# Patient Record
Sex: FEMALE | Race: WHITE | NOT HISPANIC OR LATINO | Employment: OTHER | URBAN - METROPOLITAN AREA
[De-identification: names, ages, dates, MRNs, and addresses within clinical notes are randomized per-mention and may not be internally consistent; named-entity substitution may affect disease eponyms.]

---

## 2015-05-04 LAB — HM PAP SMEAR: NEGATIVE

## 2015-05-18 LAB — HM MAMMOGRAM: NEGATIVE

## 2018-04-26 ENCOUNTER — HOSPITAL ENCOUNTER (OUTPATIENT)
Dept: GENERAL RADIOLOGY | Facility: HOSPITAL | Age: 68
Discharge: HOME | End: 2018-04-26

## 2018-04-26 ENCOUNTER — HOSPITAL ENCOUNTER (OUTPATIENT)
Dept: GENERAL RADIOLOGY | Facility: HOSPITAL | Age: 68
Discharge: HOME | End: 2018-04-26
Admitting: ORTHOPAEDIC SURGERY

## 2018-04-26 ENCOUNTER — APPOINTMENT (OUTPATIENT)
Dept: PREADMISSION TESTING | Facility: HOSPITAL | Age: 68
End: 2018-04-26

## 2018-04-26 VITALS
BODY MASS INDEX: 23.49 KG/M2 | OXYGEN SATURATION: 98 % | DIASTOLIC BLOOD PRESSURE: 88 MMHG | HEART RATE: 65 BPM | TEMPERATURE: 97.9 F | RESPIRATION RATE: 20 BRPM | WEIGHT: 141 LBS | HEIGHT: 65 IN | SYSTOLIC BLOOD PRESSURE: 144 MMHG

## 2018-04-26 LAB
ABO GROUP BLD: NORMAL
ALBUMIN SERPL-MCNC: 5 G/DL (ref 3.5–5.2)
ALBUMIN/GLOB SERPL: 1.7 G/DL
ALP SERPL-CCNC: 74 U/L (ref 39–117)
ALT SERPL W P-5'-P-CCNC: 14 U/L (ref 1–33)
ANION GAP SERPL CALCULATED.3IONS-SCNC: 11.5 MMOL/L
AST SERPL-CCNC: 18 U/L (ref 1–32)
BACTERIA UR QL AUTO: ABNORMAL /HPF
BILIRUB SERPL-MCNC: 0.8 MG/DL (ref 0.1–1.2)
BILIRUB UR QL STRIP: NEGATIVE
BLD GP AB SCN SERPL QL: NEGATIVE
BUN BLD-MCNC: 17 MG/DL (ref 8–23)
BUN/CREAT SERPL: 17.7 (ref 7–25)
CALCIUM SPEC-SCNC: 9.9 MG/DL (ref 8.6–10.5)
CHLORIDE SERPL-SCNC: 101 MMOL/L (ref 98–107)
CLARITY UR: CLEAR
CO2 SERPL-SCNC: 27.5 MMOL/L (ref 22–29)
COLOR UR: ABNORMAL
CREAT BLD-MCNC: 0.96 MG/DL (ref 0.57–1)
DEPRECATED RDW RBC AUTO: 49.4 FL (ref 37–54)
ERYTHROCYTE [DISTWIDTH] IN BLOOD BY AUTOMATED COUNT: 13.8 % (ref 11.7–13)
GFR SERPL CREATININE-BSD FRML MDRD: 58 ML/MIN/1.73
GLOBULIN UR ELPH-MCNC: 3 GM/DL
GLUCOSE BLD-MCNC: 115 MG/DL (ref 65–99)
GLUCOSE UR STRIP-MCNC: NEGATIVE MG/DL
HBA1C MFR BLD: 5.7 % (ref 4.8–5.6)
HCT VFR BLD AUTO: 42.9 % (ref 35.6–45.5)
HGB BLD-MCNC: 13.6 G/DL (ref 11.9–15.5)
HGB UR QL STRIP.AUTO: ABNORMAL
HYALINE CASTS UR QL AUTO: ABNORMAL /LPF
INR PPP: 0.97 (ref 0.9–1.1)
KETONES UR QL STRIP: NEGATIVE
LEUKOCYTE ESTERASE UR QL STRIP.AUTO: ABNORMAL
MCH RBC QN AUTO: 31.3 PG (ref 26.9–32)
MCHC RBC AUTO-ENTMCNC: 31.7 G/DL (ref 32.4–36.3)
MCV RBC AUTO: 98.6 FL (ref 80.5–98.2)
NITRITE UR QL STRIP: NEGATIVE
PH UR STRIP.AUTO: <=5 [PH] (ref 5–8)
PLATELET # BLD AUTO: 228 10*3/MM3 (ref 140–500)
PMV BLD AUTO: 11.7 FL (ref 6–12)
POTASSIUM BLD-SCNC: 4.2 MMOL/L (ref 3.5–5.2)
PROT SERPL-MCNC: 8 G/DL (ref 6–8.5)
PROT UR QL STRIP: ABNORMAL
PROTHROMBIN TIME: 12.7 SECONDS (ref 11.7–14.2)
RBC # BLD AUTO: 4.35 10*6/MM3 (ref 3.9–5.2)
RBC # UR: ABNORMAL /HPF
REF LAB TEST METHOD: ABNORMAL
RH BLD: POSITIVE
SODIUM BLD-SCNC: 140 MMOL/L (ref 136–145)
SP GR UR STRIP: 1.02 (ref 1–1.03)
SQUAMOUS #/AREA URNS HPF: ABNORMAL /HPF
T&S EXPIRATION DATE: NORMAL
UROBILINOGEN UR QL STRIP: ABNORMAL
WBC NRBC COR # BLD: 5.9 10*3/MM3 (ref 4.5–10.7)
WBC UR QL AUTO: ABNORMAL /HPF

## 2018-04-26 PROCEDURE — 85027 COMPLETE CBC AUTOMATED: CPT | Performed by: ORTHOPAEDIC SURGERY

## 2018-04-26 PROCEDURE — 93005 ELECTROCARDIOGRAM TRACING: CPT

## 2018-04-26 PROCEDURE — 73502 X-RAY EXAM HIP UNI 2-3 VIEWS: CPT

## 2018-04-26 PROCEDURE — 93010 ELECTROCARDIOGRAM REPORT: CPT | Performed by: INTERNAL MEDICINE

## 2018-04-26 PROCEDURE — 81001 URINALYSIS AUTO W/SCOPE: CPT | Performed by: ORTHOPAEDIC SURGERY

## 2018-04-26 PROCEDURE — 71046 X-RAY EXAM CHEST 2 VIEWS: CPT

## 2018-04-26 PROCEDURE — 86901 BLOOD TYPING SEROLOGIC RH(D): CPT | Performed by: ORTHOPAEDIC SURGERY

## 2018-04-26 PROCEDURE — 36415 COLL VENOUS BLD VENIPUNCTURE: CPT

## 2018-04-26 PROCEDURE — 87086 URINE CULTURE/COLONY COUNT: CPT | Performed by: ORTHOPAEDIC SURGERY

## 2018-04-26 PROCEDURE — 86900 BLOOD TYPING SEROLOGIC ABO: CPT | Performed by: ORTHOPAEDIC SURGERY

## 2018-04-26 PROCEDURE — 83036 HEMOGLOBIN GLYCOSYLATED A1C: CPT | Performed by: ORTHOPAEDIC SURGERY

## 2018-04-26 PROCEDURE — 86850 RBC ANTIBODY SCREEN: CPT | Performed by: ORTHOPAEDIC SURGERY

## 2018-04-26 PROCEDURE — 80053 COMPREHEN METABOLIC PANEL: CPT | Performed by: ORTHOPAEDIC SURGERY

## 2018-04-26 PROCEDURE — 85610 PROTHROMBIN TIME: CPT | Performed by: ORTHOPAEDIC SURGERY

## 2018-04-26 RX ORDER — DICLOFENAC 35 MG/1
1 CAPSULE ORAL EVERY MORNING
COMMUNITY
End: 2018-05-03 | Stop reason: HOSPADM

## 2018-04-26 RX ORDER — TRAMADOL HYDROCHLORIDE 100 MG/1
1 TABLET, FILM COATED, EXTENDED RELEASE ORAL AS NEEDED
COMMUNITY

## 2018-04-26 RX ORDER — HYDROXYCHLOROQUINE SULFATE 200 MG/1
200 TABLET, FILM COATED ORAL EVERY MORNING
COMMUNITY

## 2018-04-26 RX ORDER — ROSUVASTATIN CALCIUM 10 MG/1
10 TABLET, COATED ORAL NIGHTLY
COMMUNITY

## 2018-04-26 ASSESSMENT — HOOS JR
HOOS JR SCORE: 21
HOOS JR SCORE: 20.805

## 2018-04-27 LAB
BACTERIA SPEC AEROBE CULT: NORMAL
BACTERIA SPEC AEROBE CULT: NORMAL

## 2018-05-01 ENCOUNTER — HOSPITAL ENCOUNTER (INPATIENT)
Facility: HOSPITAL | Age: 68
LOS: 2 days | Discharge: HOME HEALTH | End: 2018-05-03
Attending: ORTHOPAEDIC SURGERY | Admitting: ORTHOPAEDIC SURGERY

## 2018-05-01 ENCOUNTER — ANESTHESIA (OUTPATIENT)
Dept: PERIOP | Facility: HOSPITAL | Age: 68
End: 2018-05-01

## 2018-05-01 ENCOUNTER — APPOINTMENT (OUTPATIENT)
Dept: GENERAL RADIOLOGY | Facility: HOSPITAL | Age: 68
End: 2018-05-01

## 2018-05-01 ENCOUNTER — ANESTHESIA EVENT (OUTPATIENT)
Dept: PERIOP | Facility: HOSPITAL | Age: 68
End: 2018-05-01

## 2018-05-01 DIAGNOSIS — M16.11 PRIMARY OSTEOARTHRITIS OF RIGHT HIP: ICD-10-CM

## 2018-05-01 DIAGNOSIS — R26.89 DECREASED MOBILITY: Primary | ICD-10-CM

## 2018-05-01 PROBLEM — M16.9 OA (OSTEOARTHRITIS) OF HIP: Status: ACTIVE | Noted: 2018-05-01

## 2018-05-01 PROCEDURE — 25010000003 CEFAZOLIN IN DEXTROSE 2-4 GM/100ML-% SOLUTION: Performed by: ORTHOPAEDIC SURGERY

## 2018-05-01 PROCEDURE — 25010000002 EPINEPHRINE PER 0.1 MG: Performed by: ORTHOPAEDIC SURGERY

## 2018-05-01 PROCEDURE — 94799 UNLISTED PULMONARY SVC/PX: CPT

## 2018-05-01 PROCEDURE — C1776 JOINT DEVICE (IMPLANTABLE): HCPCS | Performed by: ORTHOPAEDIC SURGERY

## 2018-05-01 PROCEDURE — 25010000002 SUCCINYLCHOLINE PER 20 MG: Performed by: NURSE ANESTHETIST, CERTIFIED REGISTERED

## 2018-05-01 PROCEDURE — 25010000002 HYDROMORPHONE PER 4 MG: Performed by: NURSE ANESTHETIST, CERTIFIED REGISTERED

## 2018-05-01 PROCEDURE — 25010000002 ROPIVACAINE PER 1 MG: Performed by: ORTHOPAEDIC SURGERY

## 2018-05-01 PROCEDURE — 0SR904Z REPLACEMENT OF RIGHT HIP JOINT WITH CERAMIC ON POLYETHYLENE SYNTHETIC SUBSTITUTE, OPEN APPROACH: ICD-10-PCS | Performed by: ORTHOPAEDIC SURGERY

## 2018-05-01 PROCEDURE — 73501 X-RAY EXAM HIP UNI 1 VIEW: CPT

## 2018-05-01 PROCEDURE — 25010000002 ONDANSETRON PER 1 MG: Performed by: NURSE ANESTHETIST, CERTIFIED REGISTERED

## 2018-05-01 PROCEDURE — 97161 PT EVAL LOW COMPLEX 20 MIN: CPT

## 2018-05-01 PROCEDURE — 25010000002 FENTANYL CITRATE (PF) 100 MCG/2ML SOLUTION: Performed by: NURSE ANESTHETIST, CERTIFIED REGISTERED

## 2018-05-01 PROCEDURE — 25010000002 PROPOFOL 10 MG/ML EMULSION: Performed by: NURSE ANESTHETIST, CERTIFIED REGISTERED

## 2018-05-01 PROCEDURE — 25010000002 MIDAZOLAM PER 1 MG: Performed by: ANESTHESIOLOGY

## 2018-05-01 PROCEDURE — 97110 THERAPEUTIC EXERCISES: CPT

## 2018-05-01 PROCEDURE — 25010000002 FENTANYL CITRATE (PF) 100 MCG/2ML SOLUTION: Performed by: ANESTHESIOLOGY

## 2018-05-01 PROCEDURE — 25010000002 MORPHINE (PF) 10 MG/ML SOLUTION 1 ML VIAL: Performed by: ORTHOPAEDIC SURGERY

## 2018-05-01 PROCEDURE — 25010000002 KETOROLAC TROMETHAMINE PER 15 MG: Performed by: ORTHOPAEDIC SURGERY

## 2018-05-01 PROCEDURE — 25010000002 DEXAMETHASONE PER 1 MG: Performed by: NURSE ANESTHETIST, CERTIFIED REGISTERED

## 2018-05-01 DEVICE — CAP HIP VE UPCHRG: Type: IMPLANTABLE DEVICE | Site: ACETABULUM | Status: FUNCTIONAL

## 2018-05-01 DEVICE — CAP CERAM HD HIP UPCHRG: Type: IMPLANTABLE DEVICE | Status: FUNCTIONAL

## 2018-05-01 DEVICE — IMPLANTABLE DEVICE
Type: IMPLANTABLE DEVICE | Site: HIP | Status: FUNCTIONAL
Brand: BIOLOX OPTION HIP SYSTEM

## 2018-05-01 DEVICE — IMPLANTABLE DEVICE
Type: IMPLANTABLE DEVICE | Site: ACETABULUM | Status: FUNCTIONAL
Brand: RINGLOC TRI-SPIKE HIP SYSTEM

## 2018-05-01 DEVICE — IMPLANTABLE DEVICE
Type: IMPLANTABLE DEVICE | Site: HIP | Status: FUNCTIONAL
Brand: BIOLOX DELTA HIP SYSTEM

## 2018-05-01 DEVICE — TOTAL HIP PRIMARY: Type: IMPLANTABLE DEVICE | Site: ACETABULUM | Status: FUNCTIONAL

## 2018-05-01 DEVICE — STEM FEM/HIP TAPERLOC COMPL F/P STD OFFST SZ6: Type: IMPLANTABLE DEVICE | Site: HIP | Status: FUNCTIONAL

## 2018-05-01 DEVICE — IMPLANTABLE DEVICE
Type: IMPLANTABLE DEVICE | Site: ACETABULUM | Status: FUNCTIONAL
Brand: RINGLOC HIP SYSTEM

## 2018-05-01 RX ORDER — FENTANYL CITRATE 50 UG/ML
50 INJECTION, SOLUTION INTRAMUSCULAR; INTRAVENOUS
Status: DISCONTINUED | OUTPATIENT
Start: 2018-05-01 | End: 2018-05-01 | Stop reason: HOSPADM

## 2018-05-01 RX ORDER — DIPHENHYDRAMINE HYDROCHLORIDE 50 MG/ML
12.5 INJECTION INTRAMUSCULAR; INTRAVENOUS
Status: DISCONTINUED | OUTPATIENT
Start: 2018-05-01 | End: 2018-05-01 | Stop reason: HOSPADM

## 2018-05-01 RX ORDER — OXYCODONE HYDROCHLORIDE AND ACETAMINOPHEN 5; 325 MG/1; MG/1
2 TABLET ORAL EVERY 4 HOURS PRN
Status: DISCONTINUED | OUTPATIENT
Start: 2018-05-01 | End: 2018-05-03 | Stop reason: HOSPADM

## 2018-05-01 RX ORDER — MAGNESIUM HYDROXIDE 1200 MG/15ML
LIQUID ORAL AS NEEDED
Status: DISCONTINUED | OUTPATIENT
Start: 2018-05-01 | End: 2018-05-01 | Stop reason: HOSPADM

## 2018-05-01 RX ORDER — LISINOPRIL 10 MG/1
10 TABLET ORAL EVERY MORNING
Status: DISCONTINUED | OUTPATIENT
Start: 2018-05-02 | End: 2018-05-03 | Stop reason: HOSPADM

## 2018-05-01 RX ORDER — ACETAMINOPHEN 325 MG/1
325 TABLET ORAL EVERY 4 HOURS PRN
Status: DISCONTINUED | OUTPATIENT
Start: 2018-05-01 | End: 2018-05-03 | Stop reason: HOSPADM

## 2018-05-01 RX ORDER — PROPOFOL 10 MG/ML
VIAL (ML) INTRAVENOUS AS NEEDED
Status: DISCONTINUED | OUTPATIENT
Start: 2018-05-01 | End: 2018-05-01 | Stop reason: SURG

## 2018-05-01 RX ORDER — OXYCODONE HYDROCHLORIDE AND ACETAMINOPHEN 5; 325 MG/1; MG/1
1 TABLET ORAL EVERY 4 HOURS PRN
Status: DISCONTINUED | OUTPATIENT
Start: 2018-05-01 | End: 2018-05-03 | Stop reason: HOSPADM

## 2018-05-01 RX ORDER — PROMETHAZINE HYDROCHLORIDE 25 MG/ML
12.5 INJECTION, SOLUTION INTRAMUSCULAR; INTRAVENOUS ONCE AS NEEDED
Status: DISCONTINUED | OUTPATIENT
Start: 2018-05-01 | End: 2018-05-01 | Stop reason: HOSPADM

## 2018-05-01 RX ORDER — DIAZEPAM 5 MG/ML
5 INJECTION, SOLUTION INTRAMUSCULAR; INTRAVENOUS 2 TIMES DAILY PRN
Status: ACTIVE | OUTPATIENT
Start: 2018-05-01 | End: 2018-05-02

## 2018-05-01 RX ORDER — SODIUM CHLORIDE 0.9 % (FLUSH) 0.9 %
1-10 SYRINGE (ML) INJECTION AS NEEDED
Status: DISCONTINUED | OUTPATIENT
Start: 2018-05-01 | End: 2018-05-01 | Stop reason: HOSPADM

## 2018-05-01 RX ORDER — HYDROMORPHONE HCL 110MG/55ML
0.5 PATIENT CONTROLLED ANALGESIA SYRINGE INTRAVENOUS
Status: DISCONTINUED | OUTPATIENT
Start: 2018-05-01 | End: 2018-05-01 | Stop reason: HOSPADM

## 2018-05-01 RX ORDER — PROPRANOLOL HCL 60 MG
60 CAPSULE, EXTENDED RELEASE 24HR ORAL EVERY MORNING
Status: DISCONTINUED | OUTPATIENT
Start: 2018-05-02 | End: 2018-05-03 | Stop reason: HOSPADM

## 2018-05-01 RX ORDER — SODIUM CHLORIDE 450 MG/100ML
100 INJECTION, SOLUTION INTRAVENOUS CONTINUOUS
Status: DISCONTINUED | OUTPATIENT
Start: 2018-05-01 | End: 2018-05-03 | Stop reason: HOSPADM

## 2018-05-01 RX ORDER — HYDROCODONE BITARTRATE AND ACETAMINOPHEN 7.5; 325 MG/1; MG/1
1 TABLET ORAL ONCE AS NEEDED
Status: DISCONTINUED | OUTPATIENT
Start: 2018-05-01 | End: 2018-05-01 | Stop reason: HOSPADM

## 2018-05-01 RX ORDER — SODIUM CHLORIDE, SODIUM LACTATE, POTASSIUM CHLORIDE, CALCIUM CHLORIDE 600; 310; 30; 20 MG/100ML; MG/100ML; MG/100ML; MG/100ML
9 INJECTION, SOLUTION INTRAVENOUS CONTINUOUS
Status: DISCONTINUED | OUTPATIENT
Start: 2018-05-01 | End: 2018-05-03 | Stop reason: HOSPADM

## 2018-05-01 RX ORDER — PROMETHAZINE HYDROCHLORIDE 25 MG/1
25 SUPPOSITORY RECTAL ONCE AS NEEDED
Status: DISCONTINUED | OUTPATIENT
Start: 2018-05-01 | End: 2018-05-01 | Stop reason: HOSPADM

## 2018-05-01 RX ORDER — FERROUS SULFATE 325(65) MG
325 TABLET ORAL
Status: DISCONTINUED | OUTPATIENT
Start: 2018-05-02 | End: 2018-05-03 | Stop reason: HOSPADM

## 2018-05-01 RX ORDER — CLINDAMYCIN PHOSPHATE 900 MG/50ML
900 INJECTION INTRAVENOUS ONCE
Status: DISCONTINUED | OUTPATIENT
Start: 2018-05-01 | End: 2018-05-03 | Stop reason: HOSPADM

## 2018-05-01 RX ORDER — DULOXETIN HYDROCHLORIDE 20 MG/1
20 CAPSULE, DELAYED RELEASE ORAL EVERY MORNING
Status: DISCONTINUED | OUTPATIENT
Start: 2018-05-02 | End: 2018-05-03 | Stop reason: HOSPADM

## 2018-05-01 RX ORDER — LIDOCAINE HYDROCHLORIDE 10 MG/ML
0.5 INJECTION, SOLUTION EPIDURAL; INFILTRATION; INTRACAUDAL; PERINEURAL ONCE AS NEEDED
Status: DISCONTINUED | OUTPATIENT
Start: 2018-05-01 | End: 2018-05-01 | Stop reason: HOSPADM

## 2018-05-01 RX ORDER — TRANEXAMIC ACID 100 MG/ML
INJECTION, SOLUTION INTRAVENOUS AS NEEDED
Status: DISCONTINUED | OUTPATIENT
Start: 2018-05-01 | End: 2018-05-01 | Stop reason: SURG

## 2018-05-01 RX ORDER — FENTANYL CITRATE 50 UG/ML
INJECTION, SOLUTION INTRAMUSCULAR; INTRAVENOUS AS NEEDED
Status: DISCONTINUED | OUTPATIENT
Start: 2018-05-01 | End: 2018-05-01 | Stop reason: SURG

## 2018-05-01 RX ORDER — ASPIRIN 325 MG
325 TABLET, DELAYED RELEASE (ENTERIC COATED) ORAL 2 TIMES DAILY WITH MEALS
Status: DISCONTINUED | OUTPATIENT
Start: 2018-05-01 | End: 2018-05-03 | Stop reason: HOSPADM

## 2018-05-01 RX ORDER — DIAZEPAM 5 MG/1
5 TABLET ORAL 2 TIMES DAILY PRN
Status: DISCONTINUED | OUTPATIENT
Start: 2018-05-01 | End: 2018-05-03 | Stop reason: HOSPADM

## 2018-05-01 RX ORDER — CHOLECALCIFEROL (VITAMIN D3) 125 MCG
5 CAPSULE ORAL NIGHTLY PRN
Status: DISCONTINUED | OUTPATIENT
Start: 2018-05-01 | End: 2018-05-03 | Stop reason: HOSPADM

## 2018-05-01 RX ORDER — HYDROMORPHONE HCL 110MG/55ML
PATIENT CONTROLLED ANALGESIA SYRINGE INTRAVENOUS AS NEEDED
Status: DISCONTINUED | OUTPATIENT
Start: 2018-05-01 | End: 2018-05-01 | Stop reason: SURG

## 2018-05-01 RX ORDER — DEXAMETHASONE SODIUM PHOSPHATE 10 MG/ML
INJECTION INTRAMUSCULAR; INTRAVENOUS AS NEEDED
Status: DISCONTINUED | OUTPATIENT
Start: 2018-05-01 | End: 2018-05-01 | Stop reason: SURG

## 2018-05-01 RX ORDER — SUCCINYLCHOLINE CHLORIDE 20 MG/ML
INJECTION INTRAMUSCULAR; INTRAVENOUS AS NEEDED
Status: DISCONTINUED | OUTPATIENT
Start: 2018-05-01 | End: 2018-05-01 | Stop reason: SURG

## 2018-05-01 RX ORDER — ROCURONIUM BROMIDE 10 MG/ML
INJECTION, SOLUTION INTRAVENOUS AS NEEDED
Status: DISCONTINUED | OUTPATIENT
Start: 2018-05-01 | End: 2018-05-01 | Stop reason: SURG

## 2018-05-01 RX ORDER — NALOXONE HCL 0.4 MG/ML
0.4 VIAL (ML) INJECTION
Status: DISCONTINUED | OUTPATIENT
Start: 2018-05-01 | End: 2018-05-03 | Stop reason: HOSPADM

## 2018-05-01 RX ORDER — HYDRALAZINE HYDROCHLORIDE 20 MG/ML
5 INJECTION INTRAMUSCULAR; INTRAVENOUS
Status: DISCONTINUED | OUTPATIENT
Start: 2018-05-01 | End: 2018-05-01 | Stop reason: HOSPADM

## 2018-05-01 RX ORDER — FAMOTIDINE 10 MG/ML
20 INJECTION, SOLUTION INTRAVENOUS ONCE
Status: COMPLETED | OUTPATIENT
Start: 2018-05-01 | End: 2018-05-01

## 2018-05-01 RX ORDER — HYDROXYCHLOROQUINE SULFATE 200 MG/1
200 TABLET, FILM COATED ORAL EVERY MORNING
Status: DISCONTINUED | OUTPATIENT
Start: 2018-05-02 | End: 2018-05-03 | Stop reason: HOSPADM

## 2018-05-01 RX ORDER — BISACODYL 10 MG
10 SUPPOSITORY, RECTAL RECTAL DAILY PRN
Status: DISCONTINUED | OUTPATIENT
Start: 2018-05-01 | End: 2018-05-03 | Stop reason: HOSPADM

## 2018-05-01 RX ORDER — EPHEDRINE SULFATE 50 MG/ML
5 INJECTION, SOLUTION INTRAVENOUS ONCE AS NEEDED
Status: DISCONTINUED | OUTPATIENT
Start: 2018-05-01 | End: 2018-05-01 | Stop reason: HOSPADM

## 2018-05-01 RX ORDER — LABETALOL HYDROCHLORIDE 5 MG/ML
5 INJECTION, SOLUTION INTRAVENOUS
Status: DISCONTINUED | OUTPATIENT
Start: 2018-05-01 | End: 2018-05-01 | Stop reason: HOSPADM

## 2018-05-01 RX ORDER — HYDROCODONE BITARTRATE AND ACETAMINOPHEN 5; 325 MG/1; MG/1
1 TABLET ORAL EVERY 6 HOURS PRN
COMMUNITY
End: 2018-05-03 | Stop reason: HOSPADM

## 2018-05-01 RX ORDER — NALOXONE HCL 0.4 MG/ML
0.2 VIAL (ML) INJECTION AS NEEDED
Status: DISCONTINUED | OUTPATIENT
Start: 2018-05-01 | End: 2018-05-01 | Stop reason: HOSPADM

## 2018-05-01 RX ORDER — OXYCODONE AND ACETAMINOPHEN 7.5; 325 MG/1; MG/1
1 TABLET ORAL ONCE AS NEEDED
Status: DISCONTINUED | OUTPATIENT
Start: 2018-05-01 | End: 2018-05-01 | Stop reason: HOSPADM

## 2018-05-01 RX ORDER — DOCUSATE SODIUM 100 MG/1
100 CAPSULE, LIQUID FILLED ORAL 2 TIMES DAILY PRN
Status: DISCONTINUED | OUTPATIENT
Start: 2018-05-01 | End: 2018-05-03 | Stop reason: HOSPADM

## 2018-05-01 RX ORDER — KETOROLAC TROMETHAMINE 30 MG/ML
15 INJECTION, SOLUTION INTRAMUSCULAR; INTRAVENOUS EVERY 8 HOURS PRN
Status: DISCONTINUED | OUTPATIENT
Start: 2018-05-01 | End: 2018-05-03 | Stop reason: HOSPADM

## 2018-05-01 RX ORDER — PROMETHAZINE HYDROCHLORIDE 25 MG/1
25 TABLET ORAL ONCE AS NEEDED
Status: DISCONTINUED | OUTPATIENT
Start: 2018-05-01 | End: 2018-05-01 | Stop reason: HOSPADM

## 2018-05-01 RX ORDER — ROSUVASTATIN CALCIUM 10 MG/1
10 TABLET, COATED ORAL NIGHTLY
Status: DISCONTINUED | OUTPATIENT
Start: 2018-05-01 | End: 2018-05-03 | Stop reason: HOSPADM

## 2018-05-01 RX ORDER — MIDAZOLAM HYDROCHLORIDE 1 MG/ML
2 INJECTION INTRAMUSCULAR; INTRAVENOUS
Status: DISCONTINUED | OUTPATIENT
Start: 2018-05-01 | End: 2018-05-01 | Stop reason: HOSPADM

## 2018-05-01 RX ORDER — MIDAZOLAM HYDROCHLORIDE 1 MG/ML
1 INJECTION INTRAMUSCULAR; INTRAVENOUS
Status: DISCONTINUED | OUTPATIENT
Start: 2018-05-01 | End: 2018-05-01 | Stop reason: HOSPADM

## 2018-05-01 RX ORDER — MORPHINE SULFATE 2 MG/ML
6 INJECTION, SOLUTION INTRAMUSCULAR; INTRAVENOUS
Status: DISCONTINUED | OUTPATIENT
Start: 2018-05-01 | End: 2018-05-03 | Stop reason: HOSPADM

## 2018-05-01 RX ORDER — ONDANSETRON 4 MG/1
4 TABLET, ORALLY DISINTEGRATING ORAL EVERY 6 HOURS PRN
Status: DISCONTINUED | OUTPATIENT
Start: 2018-05-01 | End: 2018-05-03 | Stop reason: HOSPADM

## 2018-05-01 RX ORDER — ONDANSETRON 2 MG/ML
4 INJECTION INTRAMUSCULAR; INTRAVENOUS ONCE AS NEEDED
Status: DISCONTINUED | OUTPATIENT
Start: 2018-05-01 | End: 2018-05-01 | Stop reason: HOSPADM

## 2018-05-01 RX ORDER — ONDANSETRON 4 MG/1
4 TABLET, FILM COATED ORAL EVERY 6 HOURS PRN
Status: DISCONTINUED | OUTPATIENT
Start: 2018-05-01 | End: 2018-05-03 | Stop reason: HOSPADM

## 2018-05-01 RX ORDER — ONDANSETRON 2 MG/ML
4 INJECTION INTRAMUSCULAR; INTRAVENOUS EVERY 6 HOURS PRN
Status: DISCONTINUED | OUTPATIENT
Start: 2018-05-01 | End: 2018-05-03 | Stop reason: HOSPADM

## 2018-05-01 RX ORDER — EPHEDRINE SULFATE 50 MG/ML
INJECTION, SOLUTION INTRAVENOUS AS NEEDED
Status: DISCONTINUED | OUTPATIENT
Start: 2018-05-01 | End: 2018-05-01 | Stop reason: SURG

## 2018-05-01 RX ORDER — CLINDAMYCIN PHOSPHATE 900 MG/50ML
900 INJECTION INTRAVENOUS EVERY 8 HOURS
Status: COMPLETED | OUTPATIENT
Start: 2018-05-01 | End: 2018-05-02

## 2018-05-01 RX ORDER — LIDOCAINE HYDROCHLORIDE 20 MG/ML
INJECTION, SOLUTION INFILTRATION; PERINEURAL AS NEEDED
Status: DISCONTINUED | OUTPATIENT
Start: 2018-05-01 | End: 2018-05-01 | Stop reason: SURG

## 2018-05-01 RX ORDER — PROMETHAZINE HYDROCHLORIDE 25 MG/1
12.5 TABLET ORAL ONCE AS NEEDED
Status: DISCONTINUED | OUTPATIENT
Start: 2018-05-01 | End: 2018-05-01 | Stop reason: HOSPADM

## 2018-05-01 RX ORDER — ONDANSETRON 2 MG/ML
INJECTION INTRAMUSCULAR; INTRAVENOUS AS NEEDED
Status: DISCONTINUED | OUTPATIENT
Start: 2018-05-01 | End: 2018-05-01 | Stop reason: SURG

## 2018-05-01 RX ORDER — SODIUM CHLORIDE 0.9 % (FLUSH) 0.9 %
1-10 SYRINGE (ML) INJECTION AS NEEDED
Status: DISCONTINUED | OUTPATIENT
Start: 2018-05-01 | End: 2018-05-03 | Stop reason: HOSPADM

## 2018-05-01 RX ORDER — ACETAMINOPHEN 500 MG
1000 TABLET ORAL ONCE
Status: COMPLETED | OUTPATIENT
Start: 2018-05-01 | End: 2018-05-01

## 2018-05-01 RX ORDER — FLUMAZENIL 0.1 MG/ML
0.2 INJECTION INTRAVENOUS AS NEEDED
Status: DISCONTINUED | OUTPATIENT
Start: 2018-05-01 | End: 2018-05-01 | Stop reason: HOSPADM

## 2018-05-01 RX ORDER — SENNA AND DOCUSATE SODIUM 50; 8.6 MG/1; MG/1
2 TABLET, FILM COATED ORAL 2 TIMES DAILY PRN
Status: DISCONTINUED | OUTPATIENT
Start: 2018-05-01 | End: 2018-05-03 | Stop reason: HOSPADM

## 2018-05-01 RX ORDER — CEFAZOLIN SODIUM 2 G/100ML
2 INJECTION, SOLUTION INTRAVENOUS ONCE
Status: COMPLETED | OUTPATIENT
Start: 2018-05-01 | End: 2018-05-01

## 2018-05-01 RX ORDER — CELECOXIB 200 MG/1
200 CAPSULE ORAL ONCE
Status: COMPLETED | OUTPATIENT
Start: 2018-05-01 | End: 2018-05-01

## 2018-05-01 RX ADMIN — EPHEDRINE SULFATE 10 MG: 50 INJECTION INTRAMUSCULAR; INTRAVENOUS; SUBCUTANEOUS at 11:08

## 2018-05-01 RX ADMIN — ROSUVASTATIN CALCIUM 10 MG: 10 TABLET, FILM COATED ORAL at 21:37

## 2018-05-01 RX ADMIN — ONDANSETRON 4 MG: 2 INJECTION INTRAMUSCULAR; INTRAVENOUS at 11:59

## 2018-05-01 RX ADMIN — EPHEDRINE SULFATE 10 MG: 50 INJECTION INTRAMUSCULAR; INTRAVENOUS; SUBCUTANEOUS at 12:12

## 2018-05-01 RX ADMIN — DEXAMETHASONE SODIUM PHOSPHATE 8 MG: 10 INJECTION INTRAMUSCULAR; INTRAVENOUS at 11:10

## 2018-05-01 RX ADMIN — HYDROMORPHONE HYDROCHLORIDE 0.5 MG: 2 INJECTION INTRAMUSCULAR; INTRAVENOUS; SUBCUTANEOUS at 12:20

## 2018-05-01 RX ADMIN — SUGAMMADEX 300 MG: 100 INJECTION, SOLUTION INTRAVENOUS at 12:14

## 2018-05-01 RX ADMIN — OXYCODONE HYDROCHLORIDE AND ACETAMINOPHEN 2 TABLET: 5; 325 TABLET ORAL at 18:30

## 2018-05-01 RX ADMIN — MUPIROCIN: 20 OINTMENT TOPICAL at 21:37

## 2018-05-01 RX ADMIN — CLINDAMYCIN PHOSPHATE 900 MG: 18 INJECTION, SOLUTION INTRAMUSCULAR; INTRAVENOUS at 21:37

## 2018-05-01 RX ADMIN — FENTANYL CITRATE 50 MCG: 50 INJECTION INTRAMUSCULAR; INTRAVENOUS at 09:04

## 2018-05-01 RX ADMIN — CEFAZOLIN SODIUM 2 G: 2 INJECTION, SOLUTION INTRAVENOUS at 11:58

## 2018-05-01 RX ADMIN — MIDAZOLAM HYDROCHLORIDE 1 MG: 2 INJECTION, SOLUTION INTRAMUSCULAR; INTRAVENOUS at 09:35

## 2018-05-01 RX ADMIN — PROPOFOL 130 MG: 10 INJECTION, EMULSION INTRAVENOUS at 10:43

## 2018-05-01 RX ADMIN — MIDAZOLAM HYDROCHLORIDE 1 MG: 2 INJECTION, SOLUTION INTRAMUSCULAR; INTRAVENOUS at 09:38

## 2018-05-01 RX ADMIN — CEFAZOLIN SODIUM 2 G: 2 INJECTION, SOLUTION INTRAVENOUS at 10:32

## 2018-05-01 RX ADMIN — LIDOCAINE HYDROCHLORIDE 60 MG: 20 INJECTION, SOLUTION INFILTRATION; PERINEURAL at 10:43

## 2018-05-01 RX ADMIN — CELECOXIB 200 MG: 200 CAPSULE ORAL at 08:14

## 2018-05-01 RX ADMIN — FENTANYL CITRATE 100 MCG: 50 INJECTION INTRAMUSCULAR; INTRAVENOUS at 10:38

## 2018-05-01 RX ADMIN — ACETAMINOPHEN 1000 MG: 500 TABLET, FILM COATED ORAL at 08:14

## 2018-05-01 RX ADMIN — SODIUM CHLORIDE, POTASSIUM CHLORIDE, SODIUM LACTATE AND CALCIUM CHLORIDE 9 ML/HR: 600; 310; 30; 20 INJECTION, SOLUTION INTRAVENOUS at 09:03

## 2018-05-01 RX ADMIN — HYDROMORPHONE HYDROCHLORIDE 0.5 MG: 2 INJECTION INTRAMUSCULAR; INTRAVENOUS; SUBCUTANEOUS at 11:48

## 2018-05-01 RX ADMIN — FAMOTIDINE 20 MG: 10 INJECTION, SOLUTION INTRAVENOUS at 09:03

## 2018-05-01 RX ADMIN — TRANEXAMIC ACID 1000 MG: 100 INJECTION, SOLUTION INTRAVENOUS at 10:49

## 2018-05-01 RX ADMIN — SUCCINYLCHOLINE CHLORIDE 140 MG: 20 INJECTION, SOLUTION INTRAMUSCULAR; INTRAVENOUS; PARENTERAL at 10:43

## 2018-05-01 RX ADMIN — OXYCODONE HYDROCHLORIDE AND ACETAMINOPHEN 2 TABLET: 5; 325 TABLET ORAL at 22:34

## 2018-05-01 RX ADMIN — ROCURONIUM BROMIDE 50 MG: 10 INJECTION INTRAVENOUS at 10:55

## 2018-05-01 RX ADMIN — MIDAZOLAM HYDROCHLORIDE 2 MG: 2 INJECTION, SOLUTION INTRAMUSCULAR; INTRAVENOUS at 09:03

## 2018-05-01 RX ADMIN — FENTANYL CITRATE 50 MCG: 50 INJECTION INTRAMUSCULAR; INTRAVENOUS at 09:35

## 2018-05-01 RX ADMIN — ASPIRIN 325 MG: 325 TABLET, DELAYED RELEASE ORAL at 18:30

## 2018-05-01 NOTE — PLAN OF CARE
Problem: Fall Risk (Adult)  Goal: Identify Related Risk Factors and Signs and Symptoms  Outcome: Outcome(s) achieved Date Met: 05/01/18 05/01/18 4025   Fall Risk (Adult)   Related Risk Factors (Fall Risk) environment unfamiliar

## 2018-05-01 NOTE — PLAN OF CARE
Problem: Patient Care Overview  Goal: Plan of Care Review  Outcome: Ongoing (interventions implemented as appropriate)    Goal: Individualization and Mutuality  Outcome: Ongoing (interventions implemented as appropriate)    Goal: Discharge Needs Assessment  Outcome: Ongoing (interventions implemented as appropriate)    Goal: Interprofessional Rounds/Family Conf  Outcome: Ongoing (interventions implemented as appropriate)      Problem: Hip Arthroplasty (Total, Partial) (Adult)  Goal: Signs and Symptoms of Listed Potential Problems Will be Absent, Minimized or Managed (Hip Arthroplasty)  Outcome: Ongoing (interventions implemented as appropriate)    Goal: Anesthesia/Sedation Recovery  Outcome: Ongoing (interventions implemented as appropriate)      Problem: Fall Risk (Adult)  Goal: Absence of Fall  Outcome: Ongoing (interventions implemented as appropriate)

## 2018-05-01 NOTE — H&P
"History and Physical    Patient Name:  Jaymie Sosa  YOB: 1950    Medical Records Number:  [id number]    Date of Admission:  5/1/2018  7:22 AM    Chief Complaint:  OA (osteoarthritis) of hip [M16.9]    Jaymie Sosa is a 67 y.o. female who presents c/o severe right hip pain.  The pain has been on and off for many years, worsening recently to the point where the pain is becoming disabling. The pain is a constant dull ache with occasional sharp, stabbing pains.  The patient has failed conservative treatment and would like to proceed with right total hip arthroplasty.    /73 (BP Location: Right arm, Patient Position: Lying)   Pulse 67   Temp 97.9 °F (36.6 °C) (Oral)   Resp 18   Ht 165.1 cm (65\")   Wt 64.8 kg (142 lb 14.4 oz)   SpO2 99%   BMI 23.78 kg/m²     Past Medical History:    Past Medical History:   Diagnosis Date   • Arthritis    • Fibroids    • Headache    • Hypertension        Social History:    Social History     Social History   • Marital status: Single     Spouse name: N/A   • Number of children: N/A   • Years of education: N/A     Occupational History   • Not on file.     Social History Main Topics   • Smoking status: Never Smoker   • Smokeless tobacco: Never Used   • Alcohol use No   • Drug use: No   • Sexual activity: No     Other Topics Concern   • Not on file     Social History Narrative   • No narrative on file       Family History:    Family History   Problem Relation Age of Onset   • Hypertension Mother    • Hypertension Father    • Diabetes Father    • Malig Hyperthermia Neg Hx        Current Medications:    Current Facility-Administered Medications:   •  ceFAZolin in dextrose (ANCEF) IVPB solution 2 g, 2 g, Intravenous, Once, Chico Martin MD  •  famotidine (PEPCID) injection 20 mg, 20 mg, Intravenous, Once, Je Gama MD  •  fentaNYL citrate (PF) (SUBLIMAZE) injection 50 mcg, 50 mcg, Intravenous, Q10 Min PRN, Je Gama MD  •  lactated " ringers infusion, 9 mL/hr, Intravenous, Continuous, Je Gama MD  •  lidocaine PF 1% (XYLOCAINE) injection 0.5 mL, 0.5 mL, Injection, Once PRN, Je Gama MD  •  midazolam (VERSED) injection 1 mg, 1 mg, Intravenous, Q5 Min PRN **OR** midazolam (VERSED) injection 2 mg, 2 mg, Intravenous, Q5 Min PRN, Je Gama MD  •  ropivacaine (NAROPIN) 50 mL, Morphine (PF) 5 mg, ketorolac (TORADOL) 30 mg, EPINEPHrine PF (ADRENALIN) 0.3 mg in sodium chloride 0.9 % 101.8 mL, , Injection, Once, Chico Martin MD  •  sodium chloride 0.9 % flush 1-10 mL, 1-10 mL, Intravenous, PRN, Je Gama MD    Allergies:  No Known Allergies    Review of Systems:   HEENT: Patient denies any headaches, vision changes, change in hearing, or tinnitus, Patient denies any rhinorrhea,epistaxis, sinus pain, mouth or dental problems, sore throat or hoarseness, or dysphagia  Pulmonary: Patient denies any cough, congestion, SOA, or wheezing  Cardiovascular: Patient denies any chest pain, dyspnea, palpitations, weakness, intolerance of exercise, varicosities, swelling of extremities, known murmur  Gastrointestinal:  Patient denies nausea, vomiting, diarrhea, constipation, loss  of appetite, change in appetite, dysphagia, gas, heartburn, melena, change in bowel habits, use of laxatives or other drugs to alter the function of the gastrointestinal tract.  Genital/Urinary: Patient denies dysuria, change in color of urine, change in frequency of urination, pain with urgency, incontinence, retention, or nocturia.  Musculoskeletal: Patient denies increased warmth; redness; or swelling of joints; limitation of function; deformity; crepitation: pain in a joint or an extremity, the neck, or the back, especially with movement.  Neurological: Patient denies dizziness, tremor, ataxia, difficulty in speaking, change in speech, paresthesia, loss of sensation, seizures, syncope, changes in memory.  Endocrine system: Patient denies tremors, palpitations,  intolerance of heat or cold, polyuria, polydipsia, polyphagia, diaphoresis, exophthalmos, or goiter.  Psychological: Patient denies thoughts/plans or harming self or other; depression,  insomnia, night terrors, karlo, memory loss, disorientation.  Skin: Patient denies any bruising, rashes, discoloration, pruritus, wounds, ulcers, decubiti, changes in the hair or nails  Hematopoietic: Patient denies history of spontaneous or excessive bleeding, epistaxis, hematuria, melena, fatigue, enlarged or tender lymph nodes, pallor, history of anemia.        Physical Exam:  Awake, A&O x3, affect normal, no acute distress  Ambulating with a limp due to hip pain  Hip ROM is limited due to pain  No instability  Strength is 4/5 in the quad, hamstring, abduction and adduction  Cap refill is normal, Sensation intact    Card:  RR, HD Stable  Pulm:  Regular breathing, no S.O.A  Abd:  Soft, NT, ND    Lab Results (last 24 hours)     ** No results found for the last 24 hours. **          Xr Chest Pa & Lateral    Result Date: 4/26/2018  Narrative: PA AND LATERAL CHEST X-RAY AND SINGLE VIEW PELVIS 2 VIEWS OF RIGHT HIP 04/26/2018  CLINICAL HISTORY: Patient is preop for right hip surgery, history of right hip pain and hypertension, past smoker.  CHEST: This is correlated to prior chest x-ray 02/27/2014.  FINDINGS: The cardiomediastinal silhouette and pulmonary vasculature are within normal limits. Lungs are clear. Costophrenic angles are sharp.      Impression:  No active disease is seen in the chest with no change when compared to prior chest x-ray 02/27/2014.  RIGHT HIP: Single view of the pelvis and 2 views including AP and frog-leg lateral views of right hip are submitted for interpretation. There is total left hip prosthesis in place with good alignment in the left hip prosthesis. I see no acute fracture or malalignment in bones of the pelvis or right hip. There is severe joint space narrowing in the superior compartment of the right hip  and multiple subchondral cysts and the superior lateral right acetabulum and in the right femoral head and marginal spurring off the right femoral head and acetabulum, compatible with advanced osteoarthritic change in the right hip.  IMPRESSION: There are severe osteoarthritic changes in the right hip as described. There is a left hip prosthesis in place with good alignment of the left hip prosthesis, and no acute fracture is seen in the pelvis or right hip.  This report was finalized on 4/26/2018 11:53 AM by Dr. Chico Tavares MD.      Xr Hip With Or Without Pelvis 2 - 3 View Right    Result Date: 4/26/2018  Narrative: PA AND LATERAL CHEST X-RAY AND SINGLE VIEW PELVIS 2 VIEWS OF RIGHT HIP 04/26/2018  CLINICAL HISTORY: Patient is preop for right hip surgery, history of right hip pain and hypertension, past smoker.  CHEST: This is correlated to prior chest x-ray 02/27/2014.  FINDINGS: The cardiomediastinal silhouette and pulmonary vasculature are within normal limits. Lungs are clear. Costophrenic angles are sharp.      Impression:  No active disease is seen in the chest with no change when compared to prior chest x-ray 02/27/2014.  RIGHT HIP: Single view of the pelvis and 2 views including AP and frog-leg lateral views of right hip are submitted for interpretation. There is total left hip prosthesis in place with good alignment in the left hip prosthesis. I see no acute fracture or malalignment in bones of the pelvis or right hip. There is severe joint space narrowing in the superior compartment of the right hip and multiple subchondral cysts and the superior lateral right acetabulum and in the right femoral head and marginal spurring off the right femoral head and acetabulum, compatible with advanced osteoarthritic change in the right hip.  IMPRESSION: There are severe osteoarthritic changes in the right hip as described. There is a left hip prosthesis in place with good alignment of the left hip prosthesis, and no  acute fracture is seen in the pelvis or right hip.  This report was finalized on 4/26/2018 11:53 AM by Dr. Chico Tavares MD.          Assessment:  End-stage Primary Right Hip Osteoarthritis    Plan:  Patient's pain is becoming disabling, despite extensive conservative treatment.  Radiographs reveal end-stage degenerative changes.  The risks of surgery, including, but not limited to, heart attack, stroke, dying, DVT, leg length inequality, nerve injury, vascular injury, stiffness and infection were discussed.  The alternatives and benefits were also discussed.  All questions answered and the patient wishes to proceed with right total hip arthroplasty.

## 2018-05-01 NOTE — ANESTHESIA PROCEDURE NOTES
Peripheral Block    Start time: 5/1/2018 9:35 AM  Stop time: 5/1/2018 9:46 AM  Performed by  Anesthesiologist: ANUPAMA DIAZ  Preanesthetic Checklist  Completed: patient identified, site marked, surgical consent, pre-op evaluation, timeout performed, IV checked, risks and benefits discussed and monitors and equipment checked  Prep:  Pt Position: supine  Sterile barriers:cap, gloves and sterile barriers  Prep: ChloraPrep  Patient monitoring: blood pressure monitoring, continuous pulse oximetry and EKG  Procedure  Sedation:yes  Performed under: PNB  Guidance:landmark technique  Images:still images not obtained    Laterality:right  Block Type:fascia iliaca compartment  Needle Gauge:20 G    Medications  Local Injected:ropivacaine 0.2% Local Amount Injected:60mL  Post Assessment  Patient Tolerance:comfortable throughout block  Complications:no

## 2018-05-01 NOTE — PLAN OF CARE
Problem: Patient Care Overview  Goal: Plan of Care Review   05/01/18 1548   OTHER   Outcome Summary Pt POD0 R BRIANA. She was prevoiusly independent. Upon eval, pt has minimal contraction of R quad, decreased alertness, decreased balance. Will see pt to improve mobility. Anticipat d/c home with [facility] PT. Will progress towards goals as tolerated

## 2018-05-01 NOTE — PERIOPERATIVE NURSING NOTE
Dr. Martin notified of U/A results from 4/26/18 and pt taking antibiotic, denies s/s of UTI.  Per MD, do not need to repeat urinalysis today in pre-op.

## 2018-05-01 NOTE — ANESTHESIA POSTPROCEDURE EVALUATION
Patient: Jaymie Sosa    Procedure Summary     Date:  05/01/18 Room / Location:  [facility] OR  /  [facility]    Anesthesia Start:  1032 Anesthesia Stop:  1242    Procedure:  RT TOTAL HIP ARTHROPLASTY (Right Hip) Diagnosis:      Surgeon:  Chico Martin MD Provider:  Pasha Jaimes MD    Anesthesia Type:  general, regional ASA Status:  3          Anesthesia Type: general, regional  Last vitals  BP   133/73 (05/01/18 1335)   Temp   36.4 °C (97.6 °F) (05/01/18 1238)   Pulse   51 (05/01/18 1335)   Resp   16 (05/01/18 1335)     SpO2   100 % (05/01/18 1335)     Post Anesthesia Care and Evaluation    Patient location during evaluation: PACU  Anesthetic complications: No anesthetic complications

## 2018-05-01 NOTE — OP NOTE
Operative Note    Patient Name:  Jaymie Sosa  YOB: 1950  Medical Records Number:  [id number]    Date of Procedure:  5/1/2018    Pre-operative Diagnosis:  Primary Osteoarthritis Right Hip    Post-operative Diagnosis:  Primary Osteoarthritis Right Hip    Procedure Performed:  Right Total Hip Arthroplasty                                          Layered Closure    Implants:  Biomet 50 mm Trispike Acetabular Shell, 6 mm Standard Offset Taperloc Complete Stem, 36 mm +3 Lateralized, High-wall Polyethylene Liner, std 36 mm Ceramic Head    Surgeon:  Chico Martin M.D.    Assistant: Tete Matthews (who was present during the critical portions of the case, thereby decreasing operative time and patient morbidity)    Anesthetic Type:  General    Estimated Blood Loss:  250cc's    Specimens: * No orders in the log *    No Complications      Indications for Procedure:  Jaymie Sosa is a 67 y.o. female suffering from end stage degenerative changes in the right hip.  The patients pain is becoming disabling, despite extensive conservative care, including NSAIDS, activity modification and therapy.  The risks, benefits and alternatives were discussed and the patient wishes to proceed with right total hip arthroplasty.      Procedure Performed:    After informed consent was obtained and pre-operative IV antibiotics given, the patient was taken to the operating room and placed supine on the operating table.  After general anesthesia induced and 1 gm of IV Tranxemic Acid given, the patient was placed in the left lateral decubitus position and the right lower extremity was prepped with chloraprep and draped in a sterile fashion.    An incision was made overlying the right hip.  We sharply dissected down to expose the fascia over the gluteus sheela and the Iliotibial band.  We split the fascia in line with the skin incision and placed a Charnley retractor carefully to avoid damage to the Sciatic Nerve.   We then began our posterior approach to the hip by identifying the short external rotators and tagging these with a #5 Tevdek and releasing them from their insertion on the femur.  We then identified the posterior capsule, released the capsule from it's insertion on the femur and tagged the capsule proximally and distally with a #5 Tevdek.  We then dislocated the hip and made our neck cut roughly 2-3 mm proximal to the lesser trochanter. We measured the head to be 47 mm and our neck cut to be 56 mm.      We then gained exposure of the acetabulum and sequentially reamed from a 36 mm reamer up to a 50 mm reamer.  We had excellent interference fit and a nice bleeding, bony bed for our acetabular component.  We then impacted our permanent acetabular component into place, assured we were completely seated by checking through the apical hole, then placed our permanent polyethylene liner.    We then turned our attention to the femur where we cleared the trochanteric fossa of soft tissue.  We entered the canal with a box chisel, hand held reamer and lateralizing reamer.  We then sequentially broached from a 4 mm broach up to a 6 mm broach.  We trialed with a standard neck and  Std 36 mm head and had excellent stability, range of motion and leg length equality.  An intraoperative radiograph revealed excellent implant position and alignment.  We removed our trials and copiously irrigated the hip.  We then placed our permanent 6 std offset stem and trialed again with a -3, std and +3 36 mm heads.  The std. 36 mm head gave us the best range of motion, stability and leg length equality.  We removed the trials, copiously irrigated the hip and placed our permanent head.  We placed our local anesthetic and gave IV antibiotics.  We then reduced the hip and began our layered closure.  We closed the short external rotators and posterior capsule through two drill holes in the greater trochanter and a soft tissue stitch in the Gluteus  Medius.  We closed the deep fascia with a #1 Vicryl, the deep subcutaneous tissue with a #1 Vicryl, the subcutaneous tissue with 2-0 Vicryl and the skin with 3-0 Monocryl and Dermabond.  We placed a sterile dressing of Xeroform and an Island dressing.  The patient was awakened from general anesthesia and taken to the recovery room in stable condition.    The patient will be started on Aspirin 325mg twice daily for DVT prophylaxis.  IV antibiotics will be discontinued within 24 hours of surgery.  Immediately prior to surgery, there were no acute Thromboembolic nor Cardiovascular risk factors.  An updated Medical Reconciliation form is on the chart.

## 2018-05-01 NOTE — ANESTHESIA PREPROCEDURE EVALUATION
Anesthesia Evaluation     Patient summary reviewed and Nursing notes reviewed   NPO Solid Status: > 8 hours  NPO Liquid Status: > 2 hours           Airway   Mallampati: II  no difficulty expected  Dental - normal exam     Pulmonary     breath sounds clear to auscultation  Cardiovascular     ECG reviewed  Patient on routine beta blocker and Beta blocker given within 24 hours of surgery  Rhythm: regular  Rate: normal    (+) hypertension,       Neuro/Psych  GI/Hepatic/Renal/Endo      Musculoskeletal     Abdominal    Substance History      OB/GYN          Other   (+) arthritis                     Anesthesia Plan    ASA 3     general and regional     intravenous induction   Anesthetic plan and risks discussed with patient.

## 2018-05-01 NOTE — THERAPY EVALUATION
Acute Care - Physical Therapy Initial Evaluation   [facility]     Patient Name: Jaymie Sosa  : 1950  MRN: [id number]  Today's Date: 2018         Referring Physician: Veronica      Admit Date: 2018    Visit Dx:     ICD-10-CM ICD-9-CM   1. Decreased mobility R26.89 781.99     Patient Active Problem List   Diagnosis   • OA (osteoarthritis) of hip     Past Medical History:   Diagnosis Date   • Arthritis    • Fibroids    • Headache    • Hypertension      Past Surgical History:   Procedure Laterality Date   • JOINT REPLACEMENT      lt hip   • REPLACEMENT TOTAL KNEE Bilateral    • TONSILLECTOMY          PT ASSESSMENT (last 12 hours)      Physical Therapy Evaluation     Row Name 18 1528          PT Evaluation Time/Intention    Subjective Information complains of;fatigue;pain  -MG     Document Type evaluation;progress note/recertification  -MG     Mode of Treatment physical therapy  -MG     Patient Effort good  -MG     Symptoms Noted During/After Treatment fatigue;increased pain  -MG     Row Name 18 1528          General Information    Patient Profile Reviewed? yes  -MG     Referring Physician Veronica  -     Patient Observations alert;cooperative;agree to therapy   easily arousable   -MG     Patient/Family Observations supine in bed, sleeping upon arrival, daughter present   -MG     Prior Level of Function independent:  -MG     Equipment Currently Used at Home none   has FWW and cane   -MG     Pertinent History of Current Functional Problem POD 0 R BRIANA  -MG     Existing Precautions/Restrictions fall;hip, posterior;right  -MG     Barriers to Rehab none identified  -MG     Row Name 18 1528          Relationship/Environment    Lives With --   to stay with daughter initially   -MG     Row Name 18 1528          Home Main Entrance    Number of Stairs, Main Entrance two  -MG     Stair Railings, Main Entrance --   no railing, 6 stairs w/ rail to next level    -MG     Row Name  05/01/18 1528          Cognitive Assessment/Interventions    Additional Documentation Cognitive Assessment/Intervention (Group)  -MG     Row Name 05/01/18 1528          Cognitive Assessment/Intervention- PT/OT    Affect/Mental Status (Cognitive) --   very drowsy   -MG     Orientation Status (Cognition) oriented x 4  -MG     Follows Commands (Cognition) WFL  -MG     Cognitive Function (Cognitive) WFL  -MG     Safety Deficit (Cognitive) mild deficit;awareness of need for assistance;insight into deficits/self awareness  -MG     Personal Safety Interventions fall prevention program maintained;gait belt;muscle strengthening facilitated;nonskid shoes/slippers when out of bed;supervised activity  -MG     Row Name 05/01/18 1528          Safety Issues, Functional Mobility    Impairments Affecting Function (Mobility) balance;coordination;endurance/activity tolerance;pain;strength;sensation/sensory awareness  -MG     Row Name 05/01/18 1528          Bed Mobility Assessment/Treatment    Bed Mobility Assessment/Treatment supine-sit-supine  -MG     Supine-Sit-Supine Loco (Bed Mobility) moderate assist (50% patient effort)  -MG     Bed Mobility, Safety Issues decreased use of legs for bridging/pushing  -MG     Assistive Device (Bed Mobility) bed rails;head of bed elevated  -MG     Comment (Bed Mobility) cues for hip precautions   -MG     Row Name 05/01/18 1528          Transfer Assessment/Treatment    Transfer Assessment/Treatment sit-stand transfer;stand-sit transfer  -MG     Sit-Stand [place]oco (Transfers) minimum assist (75% patient effort);2 person assist  -MG     Stand-Sit [place] (Transfers) minimum assist (75% patient effort);2 person assist  -MG     Row Name 05/01/18 1528          Sit-Stand Transfer    Assistive Device (Sit-Stand Transfers) walker, front-wheeled  -MG     Row Name 05/01/18 1528          Stand-Sit Transfer    Assistive Device (Stand-Sit Transfers) walker, front-wheeled  -MG     Row Name  05/01/18 1528          Gait/Stairs Assessment/Training    [place] Level (Gait) maximum assist (25% patient effort);2 person assist  -MG     Assistive Device (Gait) walker, front-wheeled  -MG     Distance in Feet (Gait) 2  -MG     Pattern (Gait) step-to  -MG     Comment (Gait/Stairs) R knee buckling, minimal righting reaction, cues for safe use of walker and to bear weight through hands  -MG     Row Name 05/01/18 1528          General ROM    GENERAL ROM COMMENTS R hip limited due to surgery   -MG     Row Name 05/01/18 1528          General Assessment (Manual Muscle Testing)    Comment, General Manual Muscle Testing (MMT) Assessment R hip limited post-op   -MG     Row Name 05/01/18 1528          Motor Assessment/Intervention    Additional Documentation Therapeutic Exercise (Group)  -MG     Row Name 05/01/18 1528          Therapeutic Exercise    Therapeutic Exercise supine, lower extremities  -MG     Additional Documentation Therapeutic Exercise (Row)  -MG     Row Name 05/01/18 1528          Lower Extremity Supine Therapeutic Exercise    Comment, Supine Lower Extremity (Therapeutic Exercise) R LE post op BRIANA exercises 10x. AAROM LAQ and SAQ  -MG     Row Name 05/01/18 1528          Pain Assessment    Additional Documentation Pain Scale: Numbers Pre/Post-Treatment (Group)  -MG     Row Name 05/01/18 1528          Pain Scale: Numbers Pre/Post-Treatment    Pain Scale: Numbers, Pretreatment 1/10  -MG     Pain Scale: Numbers, Post-Treatment 4/10  -MG     Pain Location - Side Right  -MG     Pain Location - Orientation posterior  -MG     Pain Location hip  -MG     Pain Intervention(s) Cold applied;Repositioned;Ambulation/increased activity  -MG     Row Name             Wound 05/01/18 1224 Right other (see notes) incision    Wound - Properties Group Date first assessed: 05/01/18  -GJ Time first assessed: 1224  -GJ Side: Right  -GJ Location: other (see notes)  -GJ Type: incision  -GJ    Row Name 05/01/18 1528           Physical Therapy Clinical Impression    Patient/Family Goals Statement (PT Clinical Impression) return marie e  -MG     Criteria for Skilled Interventions Met (PT Clinical Impression) yes;treatment indicated  -MG     Impairments Found (describe specific impairments) muscle performance;ROM;gait, locomotion, and balance  -MG     Rehab Potential (PT Clinical Summary) good, to achieve stated therapy goals  -MG     Row Name 05/01/18 1528          Physical Therapy Goals    Bed Mobility Goal Selection (PT) bed mobility, PT goal 1  -MG     Transfer Goal Selection (PT) transfer, PT goal 1  -MG     Gait Training Goal Selection (PT) gait training, PT goal 1  -MG     Stairs Goal Selection (PT) stairs, PT goal 1  -MG     Additional Documentation Stairs Goal Selection (PT) (Row)  -MG     Row Name 05/01/18 1528          Bed Mobility Goal 1 (PT)    Activity/Assistive Device (Bed Mobility Goal 1, PT) bed mobility activities, all  -MG     [place] Level/Cues Needed (Bed Mobility Goal 1, PT) conditional independence  -MG     Time Frame (Bed Mobility Goal 1, PT) 3 days  -MG     Row Name 05/01/18 1528          Transfer Goal 1 (PT)    Activity/Assistive Device (Transfer Goal 1, PT) sit-to-stand/stand-to-sit;bed-to-chair/chair-to-bed;walker, rolling  -MG     [place] Level/Cues Needed (Transfer Goal 1, PT) conditional independence  -MG     Time Frame (Transfer Goal 1, PT) 3 days  -MG     Row Name 05/01/18 1528          Gait Training Goal 1 (PT)    Activity/Assistive Device (Gait Training Goal 1, PT) gait (walking locomotion);walker, rolling  -MG     [place] Level (Gait Training Goal 1, PT) conditional independence  -MG     Distance (Gait Goal 1, PT) 100  -MG     Time Frame (Gait Training Goal 1, PT) 3 days  -MG     Row Name 05/01/18 1528          Stairs Goal 1 (PT)    Activity/Assistive Device (Stairs Goal 1, PT) ascending stairs;descending stairs  -MG     [place] Level/Cues Needed (Stairs Goal 1, PT) supervision required   -MG     Number of Stairs (Stairs Goal 1, PT) 6  -MG     Time Frame (Stairs Goal 1, PT) 1 week  -MG     Row Name 05/01/18 1528          Positioning and Restraints    Pre-Treatment Position in bed  -MG     Post Treatment Position bed  -MG     In Bed supine;call light within reach;encouraged to call for assist;exit alarm on;with family/caregiver  -MG     Row Name 05/01/18 1528          Living Environment    Home Accessibility stairs to enter home  -MG       User Key  (r) = Recorded By, (t) = Taken By, (c) = Cosigned By    Initials Name Provider Type    GJ Barbara Moya, RN Registered Nurse    MG Megan Gosselin, PT Physical Therapist          Physical Therapy Education     Title: PT OT SLP Therapies (Done)     Topic: Physical Therapy (Done)     Point: Mobility training (Done)    Learning Progress Summary     Learner Status Readiness Method Response Comment Documented by    Patient Done Acceptance E VU,NR  MG 05/01/18 1548          Point: Home exercise program (Done)    Learning Progress Summary     Learner Status Readiness Method Response Comment Documented by    Patient Done Acceptance E VU,NR  MG 05/01/18 1548          Point: Body mechanics (Done)    Learning Progress Summary     Learner Status Readiness Method Response Comment Documented by    Patient Done Acceptance E VU,NR  MG 05/01/18 1548          Point: Precautions (Done)    Learning Progress Summary     Learner Status Readiness Method Response Comment Documented by    Patient Done Acceptance E VU,NR  MG 05/01/18 1548                      User Key     Initials Effective Dates Name Provider Type [facility]    MG 04/03/18 -  Megan Gosselin, PT Physical Therapist PT                PT Recommendation and Plan  Anticipated Discharge Disposition (PT): home with home health care  Planned Therapy Interventions (PT Eval): balance training, bed mobility training, home exercise program, gait training, patient/family education, stair training, strengthening, vestibular  therapy  Therapy Frequency (PT Clinical Impression): 2 times/day  Outcome Summary/Treatment Plan (PT)  Anticipated Discharge Disposition (PT): home with home health care  Outcome Summary: Pt POD0 R BRIANA. She was prevoiusly independent. Upon eval, pt has minimal contraction of R quad, decreased alertness, decreased balance. Will see pt to improve mobility. Anticipat d/c home with [facility] PT. Will progress towards goals as tolerated           Outcome Measures     Row Name 05/01/18 1500             How much help from another person do you currently need...    Turning from your back to your side while in flat bed without using bedrails? 3  -MG      Moving from lying on back to sitting on the side of a flat bed without bedrails? 3  -MG      Moving to and from a bed to a chair (including a wheelchair)? 3  -MG      Standing up from a chair using your arms (e.g., wheelchair, bedside chair)? 2  -MG      Climbing 3-5 steps with a railing? 1  -MG      To walk in hospital room? 2  -MG      AM-PAC 6 Clicks Score 14  -MG         Functional Assessment    Outcome Measure Options AM-PAC 6 Clicks Basic Mobility (PT)  -MG        User Key  (r) = Recorded By, (t) = Taken By, (c) = Cosigned By    Initials Name Provider Type    MG Megan Gosselin, PT Physical Therapist           Time Calculation:         PT Charges     Row Name 05/01/18 1549             Time Calculation    Start Time 1522  -MG      Stop Time 1533  -MG      Time Calculation (min) 11 min  -MG      PT Received On 05/01/18  -MG      PT - Next Appointment 05/02/18  -MG      PT Goal Re-Cert Due Date 05/04/18  -MG        User Key  (r) = Recorded By, (t) = Taken By, (c) = Cosigned By    Initials Name Provider Type    MG Megan Gosselin, PT Physical Therapist          Therapy Charges for Today     Code Description Service Date Service Provider Modifiers Qty    62330779344 HC PT EVAL LOW COMPLEXITY 2 5/1/2018 Megan Gosselin, PT GP 1    41009823425 HC PT THER PROC EA 15 MIN 5/1/2018 Elida  Gosselin, PT GP 1          PT G-Codes  Outcome Measure Options: AM-PAC 6 Clicks Basic Mobility (PT)      Megan Gosselin, PT  5/1/2018

## 2018-05-02 LAB
ANION GAP SERPL CALCULATED.3IONS-SCNC: 12 MMOL/L
BUN BLD-MCNC: 22 MG/DL (ref 8–23)
BUN/CREAT SERPL: 22.7 (ref 7–25)
CALCIUM SPEC-SCNC: 9.2 MG/DL (ref 8.6–10.5)
CHLORIDE SERPL-SCNC: 102 MMOL/L (ref 98–107)
CO2 SERPL-SCNC: 25 MMOL/L (ref 22–29)
CREAT BLD-MCNC: 0.97 MG/DL (ref 0.57–1)
GFR SERPL CREATININE-BSD FRML MDRD: 57 ML/MIN/1.73
GLUCOSE BLD-MCNC: 138 MG/DL (ref 65–99)
HCT VFR BLD AUTO: 33.7 % (ref 35.6–45.5)
HGB BLD-MCNC: 10.8 G/DL (ref 11.9–15.5)
POTASSIUM BLD-SCNC: 5.1 MMOL/L (ref 3.5–5.2)
SODIUM BLD-SCNC: 139 MMOL/L (ref 136–145)

## 2018-05-02 PROCEDURE — 80048 BASIC METABOLIC PNL TOTAL CA: CPT | Performed by: ORTHOPAEDIC SURGERY

## 2018-05-02 PROCEDURE — 85014 HEMATOCRIT: CPT | Performed by: ORTHOPAEDIC SURGERY

## 2018-05-02 PROCEDURE — 97110 THERAPEUTIC EXERCISES: CPT

## 2018-05-02 PROCEDURE — 85018 HEMOGLOBIN: CPT | Performed by: ORTHOPAEDIC SURGERY

## 2018-05-02 PROCEDURE — 97150 GROUP THERAPEUTIC PROCEDURES: CPT

## 2018-05-02 RX ADMIN — CLINDAMYCIN PHOSPHATE 900 MG: 18 INJECTION, SOLUTION INTRAMUSCULAR; INTRAVENOUS at 05:53

## 2018-05-02 RX ADMIN — MUPIROCIN: 20 OINTMENT TOPICAL at 08:36

## 2018-05-02 RX ADMIN — MUPIROCIN: 20 OINTMENT TOPICAL at 20:39

## 2018-05-02 RX ADMIN — OXYCODONE HYDROCHLORIDE AND ACETAMINOPHEN 2 TABLET: 5; 325 TABLET ORAL at 06:15

## 2018-05-02 RX ADMIN — OXYCODONE HYDROCHLORIDE AND ACETAMINOPHEN 2 TABLET: 5; 325 TABLET ORAL at 14:48

## 2018-05-02 RX ADMIN — OXYCODONE HYDROCHLORIDE AND ACETAMINOPHEN 2 TABLET: 5; 325 TABLET ORAL at 19:27

## 2018-05-02 RX ADMIN — DULOXETINE 20 MG: 20 CAPSULE, DELAYED RELEASE ORAL at 08:36

## 2018-05-02 RX ADMIN — LISINOPRIL 10 MG: 10 TABLET ORAL at 08:37

## 2018-05-02 RX ADMIN — ASPIRIN 325 MG: 325 TABLET, DELAYED RELEASE ORAL at 08:36

## 2018-05-02 RX ADMIN — PROPRANOLOL HYDROCHLORIDE 60 MG: 60 CAPSULE, EXTENDED RELEASE ORAL at 08:36

## 2018-05-02 RX ADMIN — HYDROXYCHLOROQUINE SULFATE 200 MG: 200 TABLET, FILM COATED ORAL at 08:37

## 2018-05-02 RX ADMIN — ASPIRIN 325 MG: 325 TABLET, DELAYED RELEASE ORAL at 17:00

## 2018-05-02 RX ADMIN — OXYCODONE HYDROCHLORIDE AND ACETAMINOPHEN 2 TABLET: 5; 325 TABLET ORAL at 23:46

## 2018-05-02 RX ADMIN — ROSUVASTATIN CALCIUM 10 MG: 10 TABLET, FILM COATED ORAL at 20:40

## 2018-05-02 RX ADMIN — FERROUS SULFATE TAB 325 MG (65 MG ELEMENTAL FE) 325 MG: 325 (65 FE) TAB at 08:37

## 2018-05-02 RX ADMIN — OXYCODONE HYDROCHLORIDE AND ACETAMINOPHEN 1 TABLET: 5; 325 TABLET ORAL at 10:25

## 2018-05-02 NOTE — DISCHARGE PLACEMENT REQUEST
"Faby Barone (67 y.o. Female)     Date of Birth Social Security Number Address Home Phone MRN    1950  [address] [address] [phone] [id number]    Christianity Marital Status          None Single       Admission Date Admission Type Admitting Provider Attending Provider Department, Room/Bed    5/1/18 Elective Chico Martin MD Goodin, Robert A, MD [address], P782/1    Discharge Date Discharge Disposition Discharge Destination                       Attending Provider:  Chico Martin MD    Allergies:  No Known Allergies    Isolation:  None   Infection:  None   Code Status:  FULL    Ht:  165.1 cm (65\")   Wt:  64.8 kg (142 lb 14.4 oz)    Admission Cmt:  None   Principal Problem:  None                Active Insurance as of 5/1/2018     Primary Coverage     Payor Plan Insurance Group Employer/Plan Group    MEDICARE MEDICARE A & B      Payor Plan Address Payor Plan Phone Number Effective From Effective To    [address] [phone] 5/1/2013     [place] [address]       Subscriber Name Subscriber Birth Date Member ID       FABY BARONE 1950 [id number]                 Emergency Contacts      (Rel.) Home Phone Work Phone Mobile Phone    Cele Barone (Daughter) [phone] -- --    Kareen Lambert (Daughter) -- -- [phone]              "

## 2018-05-02 NOTE — THERAPY TREATMENT NOTE
Acute Care - Physical Therapy Treatment Note   [facility]     Patient Name: Jaymie Sosa  : 1950  MRN: [id number]  Today's Date: 2018        Referring Physician: Veronica    Admit Date: 2018    Visit Dx:    ICD-10-CM ICD-9-CM   1. Decreased mobility R26.89 781.99     Patient Active Problem List   Diagnosis   • OA (osteoarthritis) of hip       Therapy Treatment          Rehabilitation Treatment Summary     Row Name 18 1128             Treatment Time/Intention    Discipline physical therapist  -MS      Document Type therapy note (daily note)  -MS      Subjective Information complains of;fatigue;pain  -MS      Patient Effort good  -MS      Comment Pt. reports continued pain/soreness in her Right hip this AM.  -MS      Recorded by [MS] Alonso Eason, PT 18 1130 18 1130      Row Name 18 1128             Cognitive Assessment/Intervention- PT/OT    Orientation Status (Cognition) oriented x 3  -MS      Follows Commands (Cognition) WNL  -MS      Personal Safety Interventions fall prevention program maintained;gait belt;supervised activity;nonskid shoes/slippers when out of bed  -MS      Recorded by [MS] Alonso Eason, PT 18 1130 18 1130      Row Name 18 1128             Mobility Assessment/Intervention    Extremity Weight-bearing Status right lower extremity  -MS      Right Lower Extremity (Weight-bearing Status) weight-bearing as tolerated (WBAT)  -MS      Recorded by [MS] Alonso Eason, PT 18 1130 18 1130      Row Name 18 1128             Bed Mobility Assessment/Treatment    Comment (Bed Mobility) Up in chair this AM for the gym.  -MS      Recorded by [MS] Alonso Eason, PT 18 1130 18 1130      Row Name 18 1128             Transfer Assessment/Treatment    Sit-Stand [place] (Transfers) contact guard  -MS      Stand-Sit [place] (Transfers) contact guard  -MS      Recorded by [MS] Alonso Eason, PT  05/02/18 1130 05/02/18 1130      Row Name 05/02/18 1128             Sit-Stand Transfer    Assistive Device (Sit-Stand Transfers) walker, front-wheeled  -MS      Recorded by [MS] Alonso Eason, PT 05/02/18 1130 05/02/18 1130      Row Name 05/02/18 1128             Stand-Sit Transfer    Assistive Device (Stand-Sit Transfers) walker, front-wheeled  -MS      Recorded by [MS] Alonso Eason, PT 05/02/18 1130 05/02/18 1130      Row Name 05/02/18 1128             Gait/Stairs Assessment/Training    [place] Level (Gait) contact guard  -MS      Assistive Device (Gait) walker, front-wheeled  -MS      Distance in Feet (Gait) 100 feet  -MS      Pattern (Gait) step-through  -MS      Deviations/Abnormal Patterns (Gait) antalgic;ela decreased  -MS      Recorded by [MS] Alonso Eason, PT 05/02/18 1130 05/02/18 1130      Row Name 05/02/18 1128             Therapeutic Exercise    Comment (Therapeutic Exercise) Right THR Protocol x 15 reps completed as well as standing Hip abd, mini-squats, heel raises x 10 reps  -MS      Recorded by [MS] Alonso Eason, PT 05/02/18 1130 05/02/18 1130      Row Name 05/02/18 1128             Positioning and Restraints    Pre-Treatment Position sitting in chair/recliner  -MS      Post Treatment Position chair  -MS      In Chair notified nsg;reclined;sitting;call light within reach;encouraged to call for assist  -MS      Recorded by [MS] Alonso Eason, PT 05/02/18 1130 05/02/18 1130      Row Name 05/02/18 1128             Pain Scale: Numbers Pre/Post-Treatment    Pain Scale: Numbers, Pretreatment 4/10  -MS      Pain Scale: Numbers, Post-Treatment 4/10  -MS      Pain Location - Side Right  -MS      Pain Location hip  -MS      Pain Intervention(s) Medication (See MAR);Cold applied;Repositioned;Elevated  -MS      Recorded by [MS] Alonso Eason, PT 05/02/18 1130 05/02/18 1130      Row Name                Wound 05/01/18 1224 Right other (see notes) incision    Wound - Properties Group  Date first assessed: 05/01/18 [GJ] Time first assessed: 1224 [GJ] Side: Right [GJ] Location: other (see notes) [GJ] Type: incision [GJ] Recorded by:  [GJ] Barbara Moya RN 05/01/18 1224 05/01/18 1224      User Key  (r) = Recorded By, (t) = Taken By, (c) = Cosigned By    Initials Name Effective Dates Discipline    NANNETTE Moya RN 06/16/16 -  Nurse    MS Alonso Eason, PT 04/03/18 -  PT          Wound 05/01/18 1224 Right other (see notes) incision (Active)   Dressing Appearance dry;intact;no drainage 5/2/2018  3:22 AM   Drainage Amount none 5/2/2018  3:22 AM   Dressing Care, Wound low-adherent 5/1/2018 12:38 PM             Physical Therapy Education     Title: PT OT SLP Therapies (Done)     Topic: Physical Therapy (Done)     Point: Mobility training (Done)    Learning Progress Summary     Learner Status Readiness Method Response Comment Documented by    Patient Done Acceptance DARCY WALLACENR  MS 05/02/18 1130     Done Acceptance E VU,NR  MG 05/01/18 1548          Point: Home exercise program (Done)    Learning Progress Summary     Learner Status Readiness Method Response Comment Documented by    Patient Done Acceptance DARCY WALLACENR  MS 05/02/18 1130     Done Acceptance E VU,NR  MG 05/01/18 1548          Point: Body mechanics (Done)    Learning Progress Summary     Learner Status Readiness Method Response Comment Documented by    Patient Done Acceptance DARCY WALLACENR  MS 05/02/18 1130     Done Acceptance E VU,NR  MG 05/01/18 1548          Point: Precautions (Done)    Learning Progress Summary     Learner Status Readiness Method Response Comment Documented by    Patient Done Acceptance DARCY WALLACENR  MS 05/02/18 1130     Done Acceptance E VU,NR  MG 05/01/18 1548                      User Key     Initials Effective Dates Name Provider Type Discipline    MS 04/03/18 -  Alonso Eason, PT Physical Therapist PT    MG 04/03/18 -  Megan Gosselin, PT Physical Therapist PT                    PT Recommendation and Plan      Plan of Care Reviewed With: patient  Progress: improving  Outcome Summary: Improved tolerance to functional activity this day with an increase in gait distance and progression of ther. ex. protocol.          Outcome Measures     Row Name 05/02/18 1100 05/01/18 1500          How much help from another person do you currently need...    Turning from your back to your side while in flat bed without using bedrails? 4  -MS 3  -MG     Moving from lying on back to sitting on the side of a flat bed without bedrails? 3  -MS 3  -MG     Moving to and from a bed to a chair (including a wheelchair)? 3  -MS 3  -MG     Standing up from a chair using your arms (e.g., wheelchair, bedside chair)? 3  -MS 2  -MG     Climbing 3-5 steps with a railing? 3  -MS 1  -MG     To walk in hospital room? 3  -MS 2  -MG     AM-PAC 6 Clicks Score 19  -MS 14  -MG        Functional Assessment    Outcome Measure Options AM-PAC 6 Clicks Basic Mobility (PT)  -MS AM-PAC 6 Clicks Basic Mobility (PT)  -MG       User Key  (r) = Recorded By, (t) = Taken By, (c) = Cosigned By    Initials Name Provider Type    MS Alonso RAJANI Eason, PT Physical Therapist    MG Megan Gosselin, PT Physical Therapist           Time Calculation:         PT Charges     Row Name 05/02/18 1131             Time Calculation    Start Time 1040  -MS      Stop Time 1117  -MS      Time Calculation (min) 37 min  -MS      PT Received On 05/02/18  -MS      PT - Next Appointment 05/02/18  -MS        User Key  (r) = Recorded By, (t) = Taken By, (c) = Cosigned By    Initials Name Provider Type    MS Alonso RAJANI Eason, PT Physical Therapist          Therapy Charges for Today     Code Description Service Date Service Provider Modifiers Qty    99628897218 HC PT THER PROC EA 15 MIN 5/2/2018 Alonso Eason, PT GP 1    01525825945 HC PT THER PROC GROUP 5/2/2018 Alonso Eason, PT GP 1          PT G-Codes  Outcome Measure Options: AM-PAC 6 Clicks Basic Mobility (PT)    Alonso Eason,  PT  5/2/2018

## 2018-05-02 NOTE — PLAN OF CARE
Problem: Patient Care Overview  Goal: Plan of Care Review  Outcome: Ongoing (interventions implemented as appropriate)   05/02/18 3215   OTHER   Outcome Summary VSS. Pain controlled with PO pain med. Dressing changed, c/d/i. Ambulating with assist and walker. Voiding without difficulty. Discussed BP med and monitoring r/t HTN. Plans to d/c home tomorrow with HH.    Coping/Psychosocial   Plan of Care Reviewed With patient   Plan of Care Review   Progress improving       Problem: Hip Arthroplasty (Total, Partial) (Adult)  Goal: Signs and Symptoms of Listed Potential Problems Will be Absent, Minimized or Managed (Hip Arthroplasty)  Outcome: Ongoing (interventions implemented as appropriate)    Goal: Anesthesia/Sedation Recovery  Outcome: Ongoing (interventions implemented as appropriate)      Problem: Fall Risk (Adult)  Goal: Absence of Fall  Outcome: Ongoing (interventions implemented as appropriate)

## 2018-05-02 NOTE — PLAN OF CARE
Problem: Patient Care Overview  Goal: Plan of Care Review   05/02/18 1639   OTHER   Outcome Summary Improved tolerance to functional activity this PM with an increase in gait distance and progression of ther. ex. protocol.    Coping/Psychosocial   Plan of Care Reviewed With patient   Plan of Care Review   Progress improving

## 2018-05-02 NOTE — PROGRESS NOTES
"Ortho POD 1    Patients Name:  Jaymie Sosa  YOB: 1950  Medical Records Number:  [id number]    No complaints except pain    BP 97/65 (BP Location: Right arm, Patient Position: Lying)   Pulse 67   Temp 97.1 °F (36.2 °C) (Oral)   Resp 16   Ht 165.1 cm (65\")   Wt 64.8 kg (142 lb 14.4 oz)   SpO2 98%   BMI 23.78 kg/m²     RLE:  NVI, calf nontender, Sensation intact  No signs of DVT    Incision: clean, no infection    Lab Results (last 24 hours)     Procedure Component Value Units Date/Time    Basic Metabolic Panel [[id number]]  (Abnormal) Collected:  05/02/18 0340    Specimen:  Blood Updated:  05/02/18 0504     Glucose 138 (H) mg/dL      BUN 22 mg/dL      Creatinine 0.97 mg/dL      Sodium 139 mmol/L      Potassium 5.1 mmol/L      Chloride 102 mmol/L      CO2 25.0 mmol/L      Calcium 9.2 mg/dL      eGFR Non African Amer 57 (L) mL/min/1.73      BUN/Creatinine Ratio 22.7     Anion Gap 12.0 mmol/L     Narrative:       GFR Normal >60  Chronic Kidney Disease <60  Kidney Failure <15    Hemoglobin & Hematocrit, Blood [[id number]]  (Abnormal) Collected:  05/02/18 0340    Specimen:  Blood Updated:  05/02/18 0500     Hemoglobin 10.8 (L) g/dL      Hematocrit 33.7 (L) %           Xr Hip 1 View Without Pelvis Right (surgery Only)    Result Date: 5/1/2018  Narrative: ONE-VIEW PORTABLE RIGHT HIP  HISTORY: Hip replacement for osteoarthritis.  FINDINGS:  The patient has had recent total hip replacement and the alignment appears satisfactory.  This report was finalized on 5/1/2018 2:29 PM by Dr. Kyle Pitt M.D..      Xr Chest Pa & Lateral    Result Date: 4/26/2018  Narrative: PA AND LATERAL CHEST X-RAY AND SINGLE VIEW PELVIS 2 VIEWS OF RIGHT HIP 04/26/2018  CLINICAL HISTORY: Patient is preop for right hip surgery, history of right hip pain and hypertension, past smoker.  CHEST: This is correlated to prior chest x-ray 02/27/2014.  FINDINGS: The cardiomediastinal silhouette and pulmonary vasculature are " within normal limits. Lungs are clear. Costophrenic angles are sharp.      Impression:  No active disease is seen in the chest with no change when compared to prior chest x-ray 02/27/2014.  RIGHT HIP: Single view of the pelvis and 2 views including AP and frog-leg lateral views of right hip are submitted for interpretation. There is total left hip prosthesis in place with good alignment in the left hip prosthesis. I see no acute fracture or malalignment in bones of the pelvis or right hip. There is severe joint space narrowing in the superior compartment of the right hip and multiple subchondral cysts and the superior lateral right acetabulum and in the right femoral head and marginal spurring off the right femoral head and acetabulum, compatible with advanced osteoarthritic change in the right hip.  IMPRESSION: There are severe osteoarthritic changes in the right hip as described. There is a left hip prosthesis in place with good alignment of the left hip prosthesis, and no acute fracture is seen in the pelvis or right hip.  This report was finalized on 4/26/2018 11:53 AM by Dr. Chico Tavares MD.      Xr Hip With Or Without Pelvis 2 - 3 View Right    Result Date: 4/26/2018  Narrative: PA AND LATERAL CHEST X-RAY AND SINGLE VIEW PELVIS 2 VIEWS OF RIGHT HIP 04/26/2018  CLINICAL HISTORY: Patient is preop for right hip surgery, history of right hip pain and hypertension, past smoker.  CHEST: This is correlated to prior chest x-ray 02/27/2014.  FINDINGS: The cardiomediastinal silhouette and pulmonary vasculature are within normal limits. Lungs are clear. Costophrenic angles are sharp.      Impression:  No active disease is seen in the chest with no change when compared to prior chest x-ray 02/27/2014.  RIGHT HIP: Single view of the pelvis and 2 views including AP and frog-leg lateral views of right hip are submitted for interpretation. There is total left hip prosthesis in place with good alignment in the left hip  prosthesis. I see no acute fracture or malalignment in bones of the pelvis or right hip. There is severe joint space narrowing in the superior compartment of the right hip and multiple subchondral cysts and the superior lateral right acetabulum and in the right femoral head and marginal spurring off the right femoral head and acetabulum, compatible with advanced osteoarthritic change in the right hip.  IMPRESSION: There are severe osteoarthritic changes in the right hip as described. There is a left hip prosthesis in place with good alignment of the left hip prosthesis, and no acute fracture is seen in the pelvis or right hip.  This report was finalized on 4/26/2018 11:53 AM by Dr. Chico Tavares MD.          S/p Right BRIANA  WBAT with walker  ASA for DVT prophylaxis

## 2018-05-02 NOTE — THERAPY TREATMENT NOTE
Acute Care - Physical Therapy Treatment Note   [facility]     Patient Name: Jaymie Sosa  : 1950  MRN: [id number]  Today's Date: 2018        Referring Physician: Veronica    Admit Date: 2018    Visit Dx:    ICD-10-CM ICD-9-CM   1. Decreased mobility R26.89 781.99     Patient Active Problem List   Diagnosis   • OA (osteoarthritis) of hip       Therapy Treatment          Rehabilitation Treatment Summary     Row Name 18 1637 18 1128          Treatment Time/Intention    Discipline physical therapist  -MS physical therapist  -MS     Document Type therapy note (daily note)  -MS therapy note (daily note)  -MS     Subjective Information complains of;fatigue;pain  -MS complains of;fatigue;pain  -MS     Patient Effort good  -MS good  -MS     Comment Pt. reports increased pain/soreness in her Right hip this PM.  -MS Pt. reports continued pain/soreness in her Right hip this AM.  -MS     Recorded by [MS] Alonso Eason, PT 18 1639 18 1639 [MS] Alonso Eason, PT 18 1130 18 1130     Row Name 18 1637 18 1128          Cognitive Assessment/Intervention- PT/OT    Orientation Status (Cognition) oriented x 3  -MS oriented x 3  -MS     Follows Commands (Cognition) WNL  -MS WNL  -MS     Personal Safety Interventions fall prevention program maintained;gait belt;nonskid shoes/slippers when out of bed;supervised activity  -MS fall prevention program maintained;gait belt;supervised activity;nonskid shoes/slippers when out of bed  -MS     Recorded by [MS] Alonso Eason, PT 18 1639 18 1639 [MS] Alonso Eason, PT 18 1130 18 1130     Row Name 18 1637 18 1128          Mobility Assessment/Intervention    Extremity Weight-bearing Status  -- right lower extremity  -MS     Right Lower Extremity (Weight-bearing Status) weight-bearing as tolerated (WBAT)  -MS weight-bearing as tolerated (WBAT)  -MS     Recorded by [MS] Alonso GERARD  Yumi, PT 05/02/18 1639 05/02/18 1639 [MS] Alonso Eason, PT 05/02/18 1130 05/02/18 1130     Row Name 05/02/18 1637 05/02/18 1128          Bed Mobility Assessment/Treatment    Comment (Bed Mobility) Pt. up in chair this PM for the gym.  -MS Up in chair this AM for the gym.  -MS     Recorded by [MS] Alonso Eason, PT 05/02/18 1639 05/02/18 1639 [MS] Alonso Eason, PT 05/02/18 1130 05/02/18 1130     Row Name 05/02/18 1637 05/02/18 1128          Transfer Assessment/Treatment    Sit-Stand [place] (Transfers) contact guard  -MS contact guard  -MS     Stand-Sit [place] (Transfers) contact guard  -MS contact guard  -MS     Recorded by [MS] Alonso Eason, PT 05/02/18 1639 05/02/18 1639 [MS] Alonso Eason, PT 05/02/18 1130 05/02/18 1130     Row Name 05/02/18 1637 05/02/18 1128          Sit-Stand Transfer    Assistive Device (Sit-Stand Transfers) walker, front-wheeled  -MS walker, front-wheeled  -MS     Recorded by [MS] Alonso Eason, PT 05/02/18 1639 05/02/18 1639 [MS] Alonso Eason, PT 05/02/18 1130 05/02/18 1130     Row Name 05/02/18 1637 05/02/18 1128          Stand-Sit Transfer    Assistive Device (Stand-Sit Transfers) walker, front-wheeled  -MS walker, front-wheeled  -MS     Recorded by [MS] Alonso Eason, PT 05/02/18 1639 05/02/18 1639 [MS] Alonso Eason, PT 05/02/18 1130 05/02/18 1130     Row Name 05/02/18 1637 05/02/18 1128          Gait/Stairs Assessment/Training    [place] Level (Gait) contact guard  -MS contact guard  -MS     Assistive Device (Gait) walker, front-wheeled  -MS walker, front-wheeled  -MS     Distance in Feet (Gait) 120 feet  - feet  -MS     Pattern (Gait) step-through  -MS step-through  -MS     Deviations/Abnormal Patterns (Gait) antalgic;ela decreased  -MS antalgic;lea decreased  -MS     Recorded by [MS] Alonso Eason, PT 05/02/18 1639 05/02/18 1639 [MS] Alonso Eason, PT 05/02/18 1130 05/02/18 1130     Row Name 05/02/18 1637 05/02/18  1128          Therapeutic Exercise    Comment (Therapeutic Exercise) Right THR Protocol x 20 reps completed as well as standing hip abd, mini-squats, heel raises x 10 reps  -MS Right THR Protocol x 15 reps completed as well as standing Hip abd, mini-squats, heel raises x 10 reps  -MS     Recorded by [MS] Alonso Eason, PT 05/02/18 1639 05/02/18 1639 [MS] Alonso Eason, PT 05/02/18 1130 05/02/18 1130     Row Name 05/02/18 1637 05/02/18 1128          Positioning and Restraints    Pre-Treatment Position sitting in chair/recliner  -MS sitting in chair/recliner  -MS     Post Treatment Position chair  -MS chair  -MS     In Chair notified nsg;reclined;sitting;call light within reach;encouraged to call for assist  -MS notified nsg;reclined;sitting;call light within reach;encouraged to call for assist  -MS     Recorded by [MS] Alonso Eason, PT 05/02/18 1639 05/02/18 1639 [MS] Alonso Eason, PT 05/02/18 1130 05/02/18 1130     Row Name 05/02/18 1637 05/02/18 1128          Pain Scale: Numbers Pre/Post-Treatment    Pain Scale: Numbers, Pretreatment 6/10  -MS 4/10  -MS     Pain Scale: Numbers, Post-Treatment 6/10  -MS 4/10  -MS     Pain Location - Side Right  -MS Right  -MS     Pain Location hip  -MS hip  -MS     Pain Intervention(s) Medication (See MAR);Cold applied;Repositioned;Elevated;Rest  -MS Medication (See MAR);Cold applied;Repositioned;Elevated  -MS     Recorded by [MS] Alonso Eason, PT 05/02/18 1639 05/02/18 1639 [MS] Alonso Eason, PT 05/02/18 1130 05/02/18 1130     Row Name                Wound 05/01/18 1224 Right other (see notes) incision    Wound - Properties Group Date first assessed: 05/01/18 [GJ] Time first assessed: 1224 [GJ] Side: Right [GJ] Location: other (see notes) [GJ] Type: incision [GJ] Recorded by:  [GJ] Barbara Moya RN 05/01/18 1224 05/01/18 1224      User Key  (r) = Recorded By, (t) = Taken By, (c) = Cosigned By    Initials Name Effective Dates Discipline    NANNETTE Jimenez  Griffin RN 06/16/16 -  Nurse    MS Alonso Eason, PT 04/03/18 -  PT          Wound 05/01/18 1224 Right other (see notes) incision (Active)   Dressing Appearance dry;intact;no drainage 5/2/2018 12:30 PM   Closure Liquid skin adhesive 5/2/2018 12:30 PM   Base dry 5/2/2018 12:30 PM   Drainage Amount none 5/2/2018  3:22 AM   Dressing Care, Wound dressing changed;dressing applied 5/2/2018  8:32 AM             Physical Therapy Education     Title: PT OT SLP Therapies (Done)     Topic: Physical Therapy (Done)     Point: Mobility training (Done)    Learning Progress Summary     Learner Status Readiness Method Response Comment Documented by    Patient Done Acceptance E,D VU,NR  MS 05/02/18 1639     Done Acceptance E,D VU,NR  MS 05/02/18 1130     Done Acceptance E VU,NR  MG 05/01/18 1548          Point: Home exercise program (Done)    Learning Progress Summary     Learner Status Readiness Method Response Comment Documented by    Patient Done Acceptance E,D VU,NR  MS 05/02/18 1639     Done Acceptance E,D VU,NR  MS 05/02/18 1130     Done Acceptance E VU,NR  MG 05/01/18 1548          Point: Body mechanics (Done)    Learning Progress Summary     Learner Status Readiness Method Response Comment Documented by    Patient Done Acceptance E,D VU,NR  MS 05/02/18 1639     Done Acceptance E,D VU,NR  MS 05/02/18 1130     Done Acceptance E VU,NR  MG 05/01/18 1548          Point: Precautions (Done)    Learning Progress Summary     Learner Status Readiness Method Response Comment Documented by    Patient Done Acceptance E,D VU,NR  MS 05/02/18 1639     Done Acceptance E,D VU,NR  MS 05/02/18 1130     Done Acceptance E VU,NR  MG 05/01/18 1548                      User Key     Initials Effective Dates Name Provider Type Discipline    MS 04/03/18 -  Alonso Eason, PT Physical Therapist PT    MG 04/03/18 -  Megan Gosselin, PT Physical Therapist PT                    PT Recommendation and Plan     Plan of Care Reviewed With:  patient  Progress: improving  Outcome Summary: Improved tolerance to functional activity this PM with an increase in gait distance and progression of ther. ex. protocol.           Outcome Measures     Row Name 05/02/18 1600 05/02/18 1100 05/01/18 1500       How much help from another person do you currently need...    Turning from your back to your side while in flat bed without using bedrails? 4  -MS 4  -MS 3  -MG    Moving from lying on back to sitting on the side of a flat bed without bedrails? 3  -MS 3  -MS 3  -MG    Moving to and from a bed to a chair (including a wheelchair)? 3  -MS 3  -MS 3  -MG    Standing up from a chair using your arms (e.g., wheelchair, bedside chair)? 3  -MS 3  -MS 2  -MG    Climbing 3-5 steps with a railing? 3  -MS 3  -MS 1  -MG    To walk in hospital room? 3  -MS 3  -MS 2  -MG    AM-PAC 6 Clicks Score 19  -MS 19  -MS 14  -MG       Functional Assessment    Outcome Measure Options AM-PAC 6 Clicks Basic Mobility (PT)  -MS AM-PAC 6 Clicks Basic Mobility (PT)  -MS AM-PAC 6 Clicks Basic Mobility (PT)  -MG      User Key  (r) = Recorded By, (t) = Taken By, (c) = Cosigned By    Initials Name Provider Type    MS Alonso Eason, PT Physical Therapist    MG Megan Gosselin, PT Physical Therapist           Time Calculation:         PT Charges     Row Name 05/02/18 1639 05/02/18 1131          Time Calculation    Start Time 1500  -MS 1040  -MS     Stop Time 1525  -MS 1117  -MS     Time Calculation (min) 25 min  -MS 37 min  -MS     PT Received On 05/02/18  -MS 05/02/18  -MS     PT - Next Appointment 05/03/18  -MS 05/02/18  -MS       User Key  (r) = Recorded By, (t) = Taken By, (c) = Cosigned By    Initials Name Provider Type    MS Alonso Eason, PT Physical Therapist          Therapy Charges for Today     Code Description Service Date Service Provider Modifiers Qty    10550279918  PT THER PROC EA 15 MIN 5/2/2018 Alonso Eason, PT GP 1    [id number]  PT THER PROC GROUP 5/2/2018 Alonso GERARD  Yumi, PT GP 1    71075478631 HC PT THER PROC EA 15 MIN 5/2/2018 Alonso Eason, PT GP 1    58926820711 HC PT THER PROC GROUP 5/2/2018 Alonso Eason, PT GP 1          PT G-Codes  Outcome Measure Options: AM-PAC 6 Clicks Basic Mobility (PT)    Alonso Eason, PT  5/2/2018

## 2018-05-02 NOTE — PLAN OF CARE
Problem: Patient Care Overview  Goal: Plan of Care Review  Outcome: Ongoing (interventions implemented as appropriate)   05/02/18 7834   OTHER   Outcome Summary pain under control with pain meds. vitals stable. ambulates with assist x1. dressing dry and intact. voiding without difficulty. vascular checks wnl. NWB maintained to RLE. educated patient on importance of monitoring blood pressure given h/o hypertension, verbalises understanding.   Coping/Psychosocial   Plan of Care Reviewed With patient   Plan of Care Review   Progress improving     Goal: Individualization and Mutuality  Outcome: Ongoing (interventions implemented as appropriate)    Goal: Discharge Needs Assessment  Outcome: Ongoing (interventions implemented as appropriate)    Goal: Interprofessional Rounds/Family Conf  Outcome: Ongoing (interventions implemented as appropriate)      Problem: Hip Arthroplasty (Total, Partial) (Adult)  Goal: Signs and Symptoms of Listed Potential Problems Will be Absent, Minimized or Managed (Hip Arthroplasty)  Outcome: Ongoing (interventions implemented as appropriate)      Problem: Fall Risk (Adult)  Goal: Absence of Fall  Outcome: Ongoing (interventions implemented as appropriate)

## 2018-05-02 NOTE — PROGRESS NOTES
Discharge Planning Assessment   [facility]     Patient Name: Jaymie Sosa  MRN: [id number]  Today's Date: 5/2/2018    Admit Date: 5/1/2018          Discharge Needs Assessment     Row Name 05/02/18 1027       Living Environment    Current Living Arrangements home/apartment/condo    Family Caregiver if Needed sibling(s)    Quality of Family Relationships involved;helpful;supportive    Able to Return to Prior Arrangements yes       Resource/Environmental Concerns    Resource/Environmental Concerns none       Transition Planning    Patient/Family Anticipates Transition to home with family    Transportation Anticipated family or friend will provide       Discharge Needs Assessment    Readmission Within the Last 30 Days no previous admission in last 30 days    Concerns to be Addressed no discharge needs identified;denies needs/concerns at this time    Equipment Currently Used at Home none    Discharge Facility/Level of Care Needs home with home health    Offered/Gave Vendor List yes            Discharge Plan     Row Name 05/02/18 1037       Plan    Plan Home w/ sister and [facility].     Patient/Family in Agreement with Plan yes    Plan Comments S/w pt, verified facesheet and d/c plan. Pt plans to d/c to her sister's home ([address] , [address]). She would like [facility] notified and will accept.         Destination     No service coordination in this encounter.      Durable Medical Equipment     No service coordination in this encounter.      Dialysis/Infusion     No service coordination in this encounter.      Home Medical Care - Selection Complete     Service Request Status Selected Specialties Address Phone Number Fax Number    [facility] Selected Home Health Services [address] [place] [address] [phone]      Social Care     No service coordination in this encounter.                Demographic Summary    No documentation.           Functional Status      Row Name 05/02/18 1037       Functional Status    Usual Activity Tolerance good    Current Activity Tolerance fair       Functional Status, IADL    Medications independent    Meal Preparation independent    Housekeeping independent    Laundry independent    Shopping independent       Mental Status Summary    Recent Changes in Mental Status/Cognitive Functioning no changes            Psychosocial    No documentation.           Abuse/Neglect    No documentation.           Legal    No documentation.           Substance Abuse    No documentation.           Patient Forms     Row Name 05/02/18 1037       Patient Forms    Provider Choice List Delivered    Delivered to Patient    Method of delivery Telephone          Tammy Henderson RN

## 2018-05-03 VITALS
RESPIRATION RATE: 16 BRPM | TEMPERATURE: 98.5 F | HEART RATE: 76 BPM | OXYGEN SATURATION: 99 % | BODY MASS INDEX: 23.81 KG/M2 | HEIGHT: 65 IN | DIASTOLIC BLOOD PRESSURE: 73 MMHG | SYSTOLIC BLOOD PRESSURE: 130 MMHG | WEIGHT: 142.9 LBS

## 2018-05-03 LAB
HCT VFR BLD AUTO: 32.7 % (ref 35.6–45.5)
HGB BLD-MCNC: 10.4 G/DL (ref 11.9–15.5)

## 2018-05-03 PROCEDURE — 97110 THERAPEUTIC EXERCISES: CPT | Performed by: PHYSICAL THERAPIST

## 2018-05-03 PROCEDURE — 97150 GROUP THERAPEUTIC PROCEDURES: CPT | Performed by: PHYSICAL THERAPIST

## 2018-05-03 PROCEDURE — 85018 HEMOGLOBIN: CPT | Performed by: ORTHOPAEDIC SURGERY

## 2018-05-03 PROCEDURE — 85014 HEMATOCRIT: CPT | Performed by: ORTHOPAEDIC SURGERY

## 2018-05-03 RX ORDER — PSEUDOEPHEDRINE HCL 30 MG
100 TABLET ORAL 2 TIMES DAILY PRN
Qty: 40 CAPSULE | Refills: 1 | Status: SHIPPED | OUTPATIENT
Start: 2018-05-03

## 2018-05-03 RX ORDER — OXYCODONE HYDROCHLORIDE AND ACETAMINOPHEN 5; 325 MG/1; MG/1
1-2 TABLET ORAL EVERY 4 HOURS PRN
Qty: 80 TABLET | Refills: 0 | Status: SHIPPED | OUTPATIENT
Start: 2018-05-03

## 2018-05-03 RX ADMIN — DULOXETINE 20 MG: 20 CAPSULE, DELAYED RELEASE ORAL at 08:53

## 2018-05-03 RX ADMIN — PROPRANOLOL HYDROCHLORIDE 60 MG: 60 CAPSULE, EXTENDED RELEASE ORAL at 08:53

## 2018-05-03 RX ADMIN — OXYCODONE HYDROCHLORIDE AND ACETAMINOPHEN 2 TABLET: 5; 325 TABLET ORAL at 12:54

## 2018-05-03 RX ADMIN — FERROUS SULFATE TAB 325 MG (65 MG ELEMENTAL FE) 325 MG: 325 (65 FE) TAB at 08:53

## 2018-05-03 RX ADMIN — LISINOPRIL 10 MG: 10 TABLET ORAL at 08:53

## 2018-05-03 RX ADMIN — OXYCODONE HYDROCHLORIDE AND ACETAMINOPHEN 2 TABLET: 5; 325 TABLET ORAL at 04:13

## 2018-05-03 RX ADMIN — OXYCODONE HYDROCHLORIDE AND ACETAMINOPHEN 2 TABLET: 5; 325 TABLET ORAL at 08:52

## 2018-05-03 RX ADMIN — HYDROXYCHLOROQUINE SULFATE 200 MG: 200 TABLET, FILM COATED ORAL at 08:53

## 2018-05-03 RX ADMIN — ASPIRIN 325 MG: 325 TABLET, DELAYED RELEASE ORAL at 08:53

## 2018-05-03 NOTE — PROGRESS NOTES
"Ortho POD 2    Patients Name:  Jaymie Sosa  YOB: 1950  Medical Records Number:  [id number]    No complaints except pain    /68 (BP Location: Right arm, Patient Position: Lying)   Pulse 82   Temp 97.8 °F (36.6 °C)   Resp 16   Ht 165.1 cm (65\")   Wt 64.8 kg (142 lb 14.4 oz)   SpO2 94%   BMI 23.78 kg/m²     RLE:  NVI, calf nontender, sensation intact  No signs of DVT    Incision: clean, no infection    Lab Results (last 24 hours)     Procedure Component Value Units Date/Time    Hemoglobin & Hematocrit, Blood [[id number]]  (Abnormal) Collected:  05/03/18 0401    Specimen:  Blood Updated:  05/03/18 0441     Hemoglobin 10.4 (L) g/dL      Hematocrit 32.7 (L) %           S/p Right BRIANA  WBAT with walker  ASA for DVT prophylaxis  Home with home health today  "

## 2018-05-03 NOTE — PROGRESS NOTES
Case Management Discharge Note    Final Note: d/c home w/ [facility], [facility] following.     Destination     No service coordination in this encounter.      Durable Medical Equipment     No service coordination in this encounter.      Dialysis/Infusion     No service coordination in this encounter.      Home Medical Care - Selection Complete     Service Request Status Selected Specialties Address Phone Number Fax Number    [facility] Selected Home Health Services [address] [place] [address] [phone]      Social Care     No service coordination in this encounter.             Final Discharge Disposition Code: 06 - home with home health care

## 2018-05-03 NOTE — PLAN OF CARE
Problem: Patient Care Overview  Goal: Plan of Care Review  Outcome: Outcome(s) achieved Date Met: 05/03/18 05/03/18 1641   OTHER   Outcome Summary DC home with  -      Goal: Discharge Needs Assessment  Outcome: Outcome(s) achieved Date Met: 05/03/18    Goal: Interprofessional Rounds/Family Conf  Outcome: Outcome(s) achieved Date Met: 05/03/18      Problem: Hip Arthroplasty (Total, Partial) (Adult)  Goal: Signs and Symptoms of Listed Potential Problems Will be Absent, Minimized or Managed (Hip Arthroplasty)  Outcome: Outcome(s) achieved Date Met: 05/03/18    Goal: Anesthesia/Sedation Recovery  Outcome: Outcome(s) achieved Date Met: 05/03/18      Problem: Fall Risk (Adult)  Goal: Absence of Fall  Outcome: Outcome(s) achieved Date Met: 05/03/18

## 2018-05-03 NOTE — PLAN OF CARE
Problem: Patient Care Overview  Goal: Plan of Care Review   05/03/18 1350   OTHER   Outcome Summary Pt is progressing well, has met all goals and is safe to d/c home .

## 2018-05-03 NOTE — DISCHARGE SUMMARY
Discharge Summary    Patient Name:  Jaymie Sosa  YOB: 1950  Medical Records Number:  [id number]    Date of Admission:  5/1/2018  Date of Discharge:  5/3/39637    Primary Discharge Diagnosis:  OA (osteoarthritis) of hip [M16.9]    Secondary Discharge Diagnosis:    Problem List Items Addressed This Visit     None      Visit Diagnoses    None.         Procedures Performed:  Right Total Hip Arthroplasty      Hospital Course:    Jaymie Sosa is a 67 y.o.  female who underwent successful right ezequiel on 5/1/2018.  Jaymie Sosa was started on Aspirin 325mg po twice daily immediately post-operatively for DVT prophylaxis.  On post-op day 1 the patients dressing was changed and their incision was clean, with no signs of infection and their calf was soft, with no signs of DVT.  The patient progressed well with physical therapy and the patients hemoglobin remained stable. On post-operative day 2 the patient was felt ready for discharge.      Total Hip Replacement Discharge Instructions:    I. ACTIVITIES:  1. Exercises:  ? Complete exercise program as taught by the hospital physical therapist 2 times per day  ? Exercise program will be advanced by the physical therapist  ? During the day be up ambulating every 2 hours (while awake) for short distances  ? Complete the ankle pump exercises at least 10 times per hour (while awake)  ? Elevate legs when in bed and for at least 30 minutes during the day.Use cold packs 20-30 minutes approximately 5 times per day. This should be done before and after completing your exercises and at any time you are experiencing pain/ stiffness in your operative extremity.      2. Activities of Daily Living:  ? No tub baths, hot tubs, or swimming pools for 4 weeks  ? May shower and let water run over the incision on post-operative day #5 if no drainage. Do not scrub or rub the incision. Simply let the water run over the incision and pat dry.    II.  Restrictions  ? Continue hip precautions as taught at the hospital  ? Your surgeon will discuss with you when you will be able to drive again.  ? Weight bearing is as tolerated  ? First week stay inside on even terrain. May go up and down stairs one stair at a time utilizing the hand rail once cleared by physical therapy to do so.  ? After one week, you may venture outside (if cleared to do so by physical therapist).    III. Precautions:  ? Everyone that comes near you should wash their hands  ? No elective dental, genital-urinary, or colon procedures or surgical procedures for 12 weeks after surgery unless absolutely necessary.  ?  If dental work or surgical procedure is deemed absolutely necessary, you will need to contact your surgeon as you will need to take antibiotics 1 hour prior to any dental work (including teeth cleanings).  ? Please discuss with your surgeon prophylactic antibiotics as the length of time this intervention will be necessary for you varies with each patient’s health history and situation.  ? Avoid sick people. If you must be around someone who is ill, they should wear a mask.  ? Avoid visits to the Emergency Room or Urgent Care unless you are having a life threatening event.   ? If ordered stockings are to be placed on in the morning and removed at night. Monitor the stockings to ensure that any swelling is not causing the stockings to become too tight. In this case, remove stockings immediately.    IV. INCISION CARE:  ? Wash your hands prior to dressing changes  ? Change the dressing as needed to keep incision clean and dry. Utilize dry gauze and paper tape. Avoid touching the side of the gauze that goes against the incision with your hands.  ? No creams or ointments to the incision  ? May remove dressing once the incision is free of drainage  ? Do not touch or pick at the incision  ? Check incision every day and notify surgeon immediately if any of the following signs or symptoms are  noted:  o Increase in redness  o Increase in swelling around the incision and of the entire extremity  o Increase in pain  o Drainage oozing from the incision  o Pulling apart of the edges of the incision  o Increase in overall body temperature (greater than 100.5 degrees)  ? Your surgeon will instruct you regarding suture or staple removal    V. Medications:   1. Anticoagulants: You will be discharged on an anticoagulant. This is a prophylactic medication that helps prevent blood clots during your post-operative period. The type and length of dosage varies based on your individual needs, procedure performed, and surgeon’s preference.  ? While taking the anticoagulant, you should avoid taking any additional aspirin, ibuprofen (Advil or Motrin), Aleve (Naprosyn) or other non-steroidal anti-inflammatory medications.   ? Notify surgeon immediately if any juan bleeding is noted in the urine, stool, emesis, or from the nose or the incision. Blood in the stool will often appear as black rather than red. Blood in urine may appear as pink. Blood in emesis may appear as brown/black like coffee grounds.  ? You will need to apply pressure for longer periods of time to any cuts or abrasions to stop bleeding  ? Avoid alcohol while taking anticoagulants    2. Stool Softeners: You will be at greater risk of constipation after surgery due to being less mobile and the pain medications.   ? Take stool softeners as instructed by your surgeon while on pain medications. Over the counter Colace 100 mg 1-2 capsules twice daily.   ? If stools become too loose or too frequent, please decreases the dosage or stop the stool softener.  ? If constipation occurs despite use of stool softeners, you are to continue the stool softeners and add a laxative (Milk of Magnesia 1 ounce daily as needed)  ? Drink plenty of fluids, and eat fruits and vegetables during your recovery time    3. Pain Medications utilized after surgery are narcotics and the  law requires that the following information be given to all patients that are prescribed narcotics:  ? CLASSIFICATION: Pain medications are called Opioids and are narcotics  ? LEGALITIES: It is illegal to share narcotics with others and to drive within 24 hours of taking narcotics  ? POTENTIAL SIDE EFFECTS: Potential side effects of opioids include: nausea, vomiting, itching, dizziness, drowsiness, dry mouth, constipation, and difficulty urinating.  ? POTENTIAL ADVERSE EFFECTS:   o Opioid tolerance can develop with use of pain medications and this simply means that it requires more and more of the medication to control pain; however, this is seen more in patients that use opioids for longer periods of time.  o Opioid dependence can develop with use of Opioids and this simply means that to stop the medication can cause withdrawal symptoms; however, this is seen with patients that use Opioids for longer periods of time.  o Opioid addiction can develop with use of Opioids and the incidence of this is very unlikely in patients who take the medications as ordered and stop the medications as instructed.  o Opioid overdose can be dangerous, but is unlikely when the medication is taken as ordered and stopped when ordered. It is important not to mix opioids with alcohol or with and type of sedative such as Benadryl as this can lead to over sedation and respiratory difficulty.  ? DOSAGE:   o Pain medications will need to be taken consistently for the first week to decrease pain and promote adequate pain relief and participation in physical therapy.  o After the initial surgical pain begins to resolve, you may begin to decrease the pain medication. By the end of 6-8 weeks, you should be off of pain medications.  o Refills will not be given by the office during evening hours, on weekends, or after 6-8 weeks post-op.  o To seek refills on pain medications during the initial 6 week post-operative period, you must call the office  48 hours in advance to request the refill. The office will then notify you when to  the prescription. DO NOT wait until you are out of the medication to request a refill.    V. FOLLOW-UP VISITS:  ? You will need to follow up in the office with your surgeon in 3 weeks. Please call this number [phone]  to schedule this appointment.  If you have any concerns or suspected complications prior to your follow up visit, please call your surgeons office. Do not wait until your appointment time if you suspect complications. These will need to be addressed in the office promptly.    Discharge Medications:    1) Percocet 5/325 mg 1-2 po q 4-6 hours prn pain  2)  Enteric Coated Aspirin 325 mg po twice daily for 2 weeks, then one daily for 4 weeks    CC:RITESH Barker:Chico Martin MD

## 2018-05-03 NOTE — PLAN OF CARE
Problem: Patient Care Overview  Goal: Plan of Care Review  Outcome: Ongoing (interventions implemented as appropriate)   05/03/18 0257   OTHER   Outcome Summary HTN well controlled. pain well controlled. pt amb well with assist and wx.    Coping/Psychosocial   Plan of Care Reviewed With patient   Plan of Care Review   Progress improving       Problem: Fall Risk (Adult)  Goal: Absence of Fall  Outcome: Ongoing (interventions implemented as appropriate)   05/03/18 0257   Fall Risk (Adult)   Absence of Fall making progress toward outcome

## 2018-05-03 NOTE — THERAPY TREATMENT NOTE
Acute Care - Physical Therapy Treatment Note   [facility]     Patient Name: Jaymie Sosa  : 1950  MRN: [id number]  Today's Date: 5/3/2018        Referring Physician: Veronica    Admit Date: 2018    Visit Dx:    ICD-10-CM ICD-9-CM   1. Decreased mobility R26.89 781.99     Patient Active Problem List   Diagnosis   • OA (osteoarthritis) of hip       Therapy Treatment          Rehabilitation Treatment Summary     Row Name 18 1130             Treatment Time/Intention    Discipline physical therapist  -      Document Type therapy note (daily note)  -      Subjective Information no complaints  -KH      Mode of Treatment physical therapy  -KH      Patient Effort good  -KH      Existing Precautions/Restrictions fall;hip, posterior  -KH      Recorded by [KH] Evangelina Kay, PT 18 1351 18 1351      Row Name 18 1130             Bed Mobility Assessment/Treatment    Comment (Bed Mobility) up in chair  -KH      Recorded by [KH] Evangelina Kay, PT 18 1351 18 1351      Row Name 18 1130             Transfer Assessment/Treatment    Sit-Stand Hooker (Transfers) contact guard  -AMAN      Stand-Sit Hooker (Transfers) contact guard  -KH      Recorded by [KH] Evangelina Kay, PT 18 1351 18 1351      Row Name 18 1130             Sit-Stand Transfer    Assistive Device (Sit-Stand Transfers) walker, front-wheeled  -KH      Recorded by [KH] Evangelina Kay, PT 18 1351 18 1351      Row Name 18 1130             Stand-Sit Transfer    Assistive Device (Stand-Sit Transfers) walker, front-wheeled  -KH      Recorded by [KH] Evangelina Kay, PT 18 1351 18 1351      Row Name 18 1130             Gait/Stairs Assessment/Training    Hooker Level (Gait) supervision  -KH      Assistive Device (Gait) walker, front-wheeled  -AMAN      Distance in Feet (Gait) 110  -KH      Pattern (Gait)  step-through  -KH      Deviations/Abnormal Patterns (Gait) antalgic;gait speed decreased  -KH      Bilateral Gait Deviations weight shift ability decreased  -KH      Cochran Level (Stairs) contact guard  -KH      Handrail Location (Stairs) left side (ascending)  -KH      Number of Steps (Stairs) 4  -KH      Ascending Technique (Stairs) step-to-step  -KH      Descending Technique (Stairs) step-to-step  -KH      Recorded by [KH] Evangelina Kay, PT 05/03/18 1351 05/03/18 1351      Row Name                Wound 05/01/18 1224 Right other (see notes) incision    Wound - Properties Group Date first assessed: 05/01/18 [GJ] Time first assessed: 1224 [GJ] Side: Right [GJ] Location: other (see notes) [GJ] Type: incision [GJ] Recorded by:  [GJ] Barbara Moya RN 05/01/18 1224 05/01/18 1224      User Key  (r) = Recorded By, (t) = Taken By, (c) = Cosigned By    Initials Name Effective Dates Discipline     Evangelina Kay, PT 05/18/15 -  PT    NANNETTE Moya RN 06/16/16 -  Nurse          Wound 05/01/18 1224 Right other (see notes) incision (Active)   Dressing Appearance dry;intact;no drainage 5/3/2018  8:00 AM   Closure Liquid skin adhesive 5/3/2018  8:00 AM   Base dry 5/3/2018  8:00 AM             Physical Therapy Education     Title: PT OT SLP Therapies (Done)     Topic: Physical Therapy (Done)     Point: Mobility training (Done)    Learning Progress Summary     Learner Status Readiness Method Response Comment Documented by    Patient Done Acceptance E BRII,EVA NEWTON 05/03/18 1351     Done Acceptance EDARCY NR  MS 05/02/18 1639     Done Acceptance ED CHILO TERESA  MS 05/02/18 1130     Done Acceptance E CHILO TERESA   05/01/18 1548          Point: Home exercise program (Done)    Learning Progress Summary     Learner Status Readiness Method Response Comment Documented by    Patient Done Acceptance E EVA TERESA 05/03/18 1351     Done Acceptance ED CHILO TERESA  MS 05/02/18 1639     Done Acceptance ED CHILO TERESA  MS 05/02/18  1130     Done Acceptance E VU,NR  MG 05/01/18 1548          Point: Body mechanics (Done)    Learning Progress Summary     Learner Status Readiness Method Response Comment Documented by    Patient Done Acceptance E VU,DU   05/03/18 1351     Done Acceptance E,D VU,NR  MS 05/02/18 1639     Done Acceptance E,D VU,NR  MS 05/02/18 1130     Done Acceptance E VU,NR  MG 05/01/18 1548          Point: Precautions (Done)    Learning Progress Summary     Learner Status Readiness Method Response Comment Documented by    Patient Done Acceptance E VU,DU   05/03/18 1351     Done Acceptance E,D VU,NR  MS 05/02/18 1639     Done Acceptance E,D VU,NR  MS 05/02/18 1130     Done Acceptance E VU,NR  MG 05/01/18 1548                      User Key     Initials Effective Dates Name Provider Type Discipline     05/18/15 -  Evangelina Kay, PT Physical Therapist PT    MS 04/03/18 -  Alonso Eason, PT Physical Therapist PT     04/03/18 -  Megan Gosselin, PT Physical Therapist PT                    PT Recommendation and Plan  Anticipated Discharge Disposition (PT): home with home health care  Outcome Summary/Treatment Plan (PT)  Anticipated Discharge Disposition (PT): home with home health care  Outcome Summary: Pt is progressing well, has met all goals and is safe to d/c home .          Outcome Measures     Row Name 05/02/18 1600 05/02/18 1100 05/01/18 1500       How much help from another person do you currently need...    Turning from your back to your side while in flat bed without using bedrails? 4  -MS 4  -MS 3  -MG    Moving from lying on back to sitting on the side of a flat bed without bedrails? 3  -MS 3  -MS 3  -MG    Moving to and from a bed to a chair (including a wheelchair)? 3  -MS 3  -MS 3  -MG    Standing up from a chair using your arms (e.g., wheelchair, bedside chair)? 3  -MS 3  -MS 2  -MG    Climbing 3-5 steps with a railing? 3  -MS 3  -MS 1  -MG    To walk in hospital room? 3  -MS 3  -MS 2  -MG    AM-PAC 6  Clicks Score 19  -MS 19  -MS 14  -MG       Functional Assessment    Outcome Measure Options AM-PAC 6 Clicks Basic Mobility (PT)  -MS AM-PAC 6 Clicks Basic Mobility (PT)  -MS AM-PAC 6 Clicks Basic Mobility (PT)  -MG      User Key  (r) = Recorded By, (t) = Taken By, (c) = Cosigned By    Initials Name Provider Type    MS Alonso GERARD Yumi, PT Physical Therapist    MG Megan Gosselin, PT Physical Therapist           Time Calculation:         PT Charges     Row Name 05/03/18 1352             Time Calculation    Start Time 1045  -KH      Stop Time 1115  -KH      Time Calculation (min) 30 min  -KH      PT Received On 05/03/18  -        User Key  (r) = Recorded By, (t) = Taken By, (c) = Cosigned By    Initials Name Provider Type     Evangelina Kay, PT Physical Therapist          Therapy Charges for Today     Code Description Service Date Service Provider Modifiers Qty    83738939589 HC PT THER PROC EA 15 MIN 5/3/2018 Evangelina Kay, PT GP 1    03683786698 HC PT THER PROC GROUP 5/3/2018 Evangelina Kay, PT GP 1          PT G-Codes  Outcome Measure Options: AM-PAC 6 Clicks Basic Mobility (PT)    Evangelina Kay, PT  5/3/2018

## 2018-05-05 NOTE — PROGRESS NOTES
Continued Stay Note   [facility]     Patient Name: Jaymie Sosa  MRN: [id number]  Today's Date: 5/5/2018    Admit Date: 5/1/2018          Discharge Plan     Row Name 05/05/18 1925       Plan    Plan Comments Call from [facility] this AM stating patient was discharged home from [facility] on 5/3/18 and did not receive walker and 3-in-1 commode she needed for DC. S/W Patient by phone at number on face sheet and introduced self and role. Pt. agreeable to [facility] for equipment and verified family member's address as previously charted in record as correct. Orders for equipment obtained and sent to [facility] and S/W Eryn at [facility] to verify they received all information needed. Call back to patient to ensure [facility] had contacted her by 2pm and she stated yes they had and delivery arrangements for this afternoon were made and equipment was expected any time. Pt. has CCP contact information if equipment does not arrive as anticipated. Call back to [facility] and updated...........Naveen PARKS               Discharge Codes    No documentation.       Expected Discharge Date and Time     Expected Discharge Date Expected Discharge Time    May 3, 2018             Jelena Sheehan, CHARLY

## 2020-08-05 NOTE — ANESTHESIA PROCEDURE NOTES
Airway  Urgency: elective    Date/Time: 5/1/2018 10:44 AM  Airway not difficult    General Information and Staff    Patient location during procedure: OR  Anesthesiologist: SREEDHAR HURTADO  CRNA: LATOYA MOSLEY    Indications and Patient Condition  Indications for airway management: airway protection    Preoxygenated: yes  Mask difficulty assessment: 1 - vent by mask    Final Airway Details  Final airway type: endotracheal airway      Successful airway: ETT  Cuffed: yes   Successful intubation technique: direct laryngoscopy  Facilitating devices/methods: intubating stylet  Endotracheal tube insertion site: oral  Blade: Espinal  Blade size: #2  ETT size: 7.0 mm  Cormack-Lehane Classification: grade IIa - partial view of glottis  Placement verified by: chest auscultation and capnometry   Cuff volume (mL): 5  Measured from: lips  ETT to lips (cm): 20  Number of attempts at approach: 1    Additional Comments  EBBS: ETCO2+: Atraumatic intubation; Lips and teeth same as pre op             06-Aug-2020

## 2025-06-06 NOTE — PLAN OF CARE
No Problem: Patient Care Overview  Goal: Plan of Care Review   05/02/18 1130   OTHER   Outcome Summary Improved tolerance to functional activity this day with an increase in gait distance and progression of ther. ex. protocol.   Coping/Psychosocial   Plan of Care Reviewed With patient   Plan of Care Review   Progress improving

## (undated) DEVICE — GLV SURG BIOGEL LTX PF 7 1/2

## (undated) DEVICE — DRAPE,REIN 53X77,STERILE: Brand: MEDLINE

## (undated) DEVICE — RECIPROCATING BLADE HEAVY DUTY LONG, OFFSET  (77.6 X 0.77 X 11.2MM)

## (undated) DEVICE — ANTIBACTERIAL UNDYED BRAIDED (POLYGLACTIN 910), SYNTHETIC ABSORBABLE SUTURE: Brand: COATED VICRYL

## (undated) DEVICE — ADHS SKIN DERMABOND TOP ADVANCED

## (undated) DEVICE — HEWSON SUTURE RETRIEVER: Brand: HEWSON SUTURE RETRIEVER

## (undated) DEVICE — SYR LUERLOK 30CC

## (undated) DEVICE — SUT TEVDEX 5 K61 30IN 79745

## (undated) DEVICE — PILLW ABD SM

## (undated) DEVICE — DECANT BG O JET

## (undated) DEVICE — BNDG ELAS CO-FLEX SLF ADHR 6IN 5YD LF STRL

## (undated) DEVICE — PK HIP TOTL 40

## (undated) DEVICE — APPL CHLORAPREP W/TINT 26ML ORNG

## (undated) DEVICE — MAT FLR ABSORBENT LG 4FT 10 2.5FT

## (undated) DEVICE — OCCLUSIVE GAUZE STRIP,3% BISMUTH TRIBROMOPHENATE IN PETROLATUM BLEND: Brand: XEROFORM

## (undated) DEVICE — SPNG LAP 18X18IN LF STRL PK/5

## (undated) DEVICE — DRSNG WND GZ PAD BORDERED 4X8IN STRL

## (undated) DEVICE — HANDPIECE SET WITH COAXIAL HIGH FLOW TIP AND SUCTION TUBE: Brand: INTERPULSE

## (undated) DEVICE — SUT MNCRYL 3/0 PS2 18IN MCP497G

## (undated) DEVICE — NDL SPINE 20G 3 1/2 YEL STRL 1P/U

## (undated) DEVICE — ENCORE® LATEX ORTHO SIZE 7.5, STERILE LATEX POWDER-FREE SURGICAL GLOVE: Brand: ENCORE

## (undated) DEVICE — 3M™ IOBAN™ 2 ANTIMICROBIAL INCISE DRAPE 6648EZ: Brand: IOBAN™ 2